# Patient Record
Sex: FEMALE | Race: WHITE
[De-identification: names, ages, dates, MRNs, and addresses within clinical notes are randomized per-mention and may not be internally consistent; named-entity substitution may affect disease eponyms.]

---

## 2022-01-03 ENCOUNTER — HOSPITAL ENCOUNTER (INPATIENT)
Dept: HOSPITAL 63 - GEROPSY | Age: 82
LOS: 17 days | Discharge: HOME HEALTH | DRG: 57 | End: 2022-01-20
Attending: PSYCHIATRY & NEUROLOGY | Admitting: PSYCHIATRY & NEUROLOGY
Payer: MEDICARE

## 2022-01-03 VITALS — SYSTOLIC BLOOD PRESSURE: 136 MMHG | DIASTOLIC BLOOD PRESSURE: 80 MMHG

## 2022-01-03 VITALS — HEIGHT: 65 IN | WEIGHT: 136.91 LBS | BODY MASS INDEX: 22.81 KG/M2

## 2022-01-03 DIAGNOSIS — G30.9: Primary | ICD-10-CM

## 2022-01-03 DIAGNOSIS — F32.A: ICD-10-CM

## 2022-01-03 DIAGNOSIS — R63.30: ICD-10-CM

## 2022-01-03 DIAGNOSIS — Z96.82: ICD-10-CM

## 2022-01-03 DIAGNOSIS — R26.81: ICD-10-CM

## 2022-01-03 DIAGNOSIS — Z88.0: ICD-10-CM

## 2022-01-03 DIAGNOSIS — R40.0: ICD-10-CM

## 2022-01-03 DIAGNOSIS — M41.9: ICD-10-CM

## 2022-01-03 DIAGNOSIS — R45.1: ICD-10-CM

## 2022-01-03 DIAGNOSIS — N39.0: ICD-10-CM

## 2022-01-03 DIAGNOSIS — Z20.822: ICD-10-CM

## 2022-01-03 DIAGNOSIS — H91.90: ICD-10-CM

## 2022-01-03 DIAGNOSIS — G47.00: ICD-10-CM

## 2022-01-03 DIAGNOSIS — M54.9: ICD-10-CM

## 2022-01-03 DIAGNOSIS — Z91.83: ICD-10-CM

## 2022-01-03 DIAGNOSIS — F41.1: ICD-10-CM

## 2022-01-03 DIAGNOSIS — Z79.899: ICD-10-CM

## 2022-01-03 DIAGNOSIS — Z87.440: ICD-10-CM

## 2022-01-03 DIAGNOSIS — R15.9: ICD-10-CM

## 2022-01-03 DIAGNOSIS — G89.29: ICD-10-CM

## 2022-01-03 DIAGNOSIS — E78.5: ICD-10-CM

## 2022-01-03 DIAGNOSIS — R32: ICD-10-CM

## 2022-01-03 DIAGNOSIS — F63.9: ICD-10-CM

## 2022-01-03 DIAGNOSIS — F02.81: ICD-10-CM

## 2022-01-03 DIAGNOSIS — R45.87: ICD-10-CM

## 2022-01-03 LAB
ALBUMIN SERPL-MCNC: 3.7 G/DL (ref 3.4–5)
ALBUMIN/GLOB SERPL: 1.1 {RATIO} (ref 1–1.7)
ALP SERPL-CCNC: 94 U/L (ref 46–116)
ALT SERPL-CCNC: 16 U/L (ref 14–59)
ANION GAP SERPL CALC-SCNC: 9 MMOL/L (ref 6–14)
AST SERPL-CCNC: 19 U/L (ref 15–37)
BASOPHILS # BLD AUTO: 0 X10^3/UL (ref 0–0.2)
BASOPHILS NFR BLD: 1 % (ref 0–3)
BILIRUB SERPL-MCNC: 0.3 MG/DL (ref 0.2–1)
BUN/CREAT SERPL: 31 (ref 6–20)
CA-I SERPL ISE-MCNC: 25 MG/DL (ref 7–20)
CALCIUM SERPL-MCNC: 8.7 MG/DL (ref 8.5–10.1)
CHLORIDE SERPL-SCNC: 99 MMOL/L (ref 98–107)
CO2 SERPL-SCNC: 27 MMOL/L (ref 21–32)
CREAT SERPL-MCNC: 0.8 MG/DL (ref 0.6–1)
EOSINOPHIL NFR BLD: 0 X10^3/UL (ref 0–0.7)
EOSINOPHIL NFR BLD: 1 % (ref 0–3)
ERYTHROCYTE [DISTWIDTH] IN BLOOD BY AUTOMATED COUNT: 12.6 % (ref 11.5–14.5)
GFR SERPLBLD BASED ON 1.73 SQ M-ARVRAT: 68.5 ML/MIN
GLOBULIN SER-MCNC: 3.4 G/DL (ref 2.2–3.8)
GLUCOSE SERPL-MCNC: 109 MG/DL (ref 70–99)
HCT VFR BLD CALC: 35.3 % (ref 36–47)
HGB BLD-MCNC: 11.9 G/DL (ref 12–15.5)
LYMPHOCYTES # BLD: 1.7 X10^3/UL (ref 1–4.8)
LYMPHOCYTES NFR BLD AUTO: 21 % (ref 24–48)
MAGNESIUM SERPL-MCNC: 2.1 MG/DL (ref 1.8–2.4)
MCH RBC QN AUTO: 31 PG (ref 25–35)
MCHC RBC AUTO-ENTMCNC: 34 G/DL (ref 31–37)
MCV RBC AUTO: 91 FL (ref 79–100)
MONO #: 0.6 X10^3/UL (ref 0–1.1)
MONOCYTES NFR BLD: 8 % (ref 0–9)
NEUT #: 5.4 X10^3UL (ref 1.8–7.7)
NEUTROPHILS NFR BLD AUTO: 70 % (ref 31–73)
PLATELET # BLD AUTO: 331 X10^3/UL (ref 140–400)
POTASSIUM SERPL-SCNC: 4 MMOL/L (ref 3.5–5.1)
PROT SERPL-MCNC: 7.1 G/DL (ref 6.4–8.2)
RBC # BLD AUTO: 3.87 X10^6/UL (ref 3.5–5.4)
SODIUM SERPL-SCNC: 135 MMOL/L (ref 136–145)
WBC # BLD AUTO: 7.8 X10^3/UL (ref 4–11)

## 2022-01-03 PROCEDURE — 85379 FIBRIN DEGRADATION QUANT: CPT

## 2022-01-03 PROCEDURE — 82607 VITAMIN B-12: CPT

## 2022-01-03 PROCEDURE — 87086 URINE CULTURE/COLONY COUNT: CPT

## 2022-01-03 PROCEDURE — 87077 CULTURE AEROBIC IDENTIFY: CPT

## 2022-01-03 PROCEDURE — 85025 COMPLETE CBC W/AUTO DIFF WBC: CPT

## 2022-01-03 PROCEDURE — 83735 ASSAY OF MAGNESIUM: CPT

## 2022-01-03 PROCEDURE — 80061 LIPID PANEL: CPT

## 2022-01-03 PROCEDURE — 93005 ELECTROCARDIOGRAM TRACING: CPT

## 2022-01-03 PROCEDURE — 84436 ASSAY OF TOTAL THYROXINE: CPT

## 2022-01-03 PROCEDURE — 83540 ASSAY OF IRON: CPT

## 2022-01-03 PROCEDURE — 87186 SC STD MICRODIL/AGAR DIL: CPT

## 2022-01-03 PROCEDURE — 83550 IRON BINDING TEST: CPT

## 2022-01-03 PROCEDURE — U0003 INFECTIOUS AGENT DETECTION BY NUCLEIC ACID (DNA OR RNA); SEVERE ACUTE RESPIRATORY SYNDROME CORONAVIRUS 2 (SARS-COV-2) (CORONAVIRUS DISEASE [COVID-19]), AMPLIFIED PROBE TECHNIQUE, MAKING USE OF HIGH THROUGHPUT TECHNOLOGIES AS DESCRIBED BY CMS-2020-01-R: HCPCS

## 2022-01-03 PROCEDURE — 80053 COMPREHEN METABOLIC PANEL: CPT

## 2022-01-03 PROCEDURE — 83036 HEMOGLOBIN GLYCOSYLATED A1C: CPT

## 2022-01-03 PROCEDURE — 84443 ASSAY THYROID STIM HORMONE: CPT

## 2022-01-03 PROCEDURE — 86592 SYPHILIS TEST NON-TREP QUAL: CPT

## 2022-01-03 PROCEDURE — 36415 COLL VENOUS BLD VENIPUNCTURE: CPT

## 2022-01-03 PROCEDURE — 81001 URINALYSIS AUTO W/SCOPE: CPT

## 2022-01-03 PROCEDURE — 84480 ASSAY TRIIODOTHYRONINE (T3): CPT

## 2022-01-03 PROCEDURE — 82306 VITAMIN D 25 HYDROXY: CPT

## 2022-01-03 RX ADMIN — MEMANTINE HYDROCHLORIDE SCH MG: 5 TABLET ORAL at 20:09

## 2022-01-03 RX ADMIN — DONEPEZIL HYDROCHLORIDE SCH MG: 10 TABLET ORAL at 20:09
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
## 2022-01-04 VITALS — DIASTOLIC BLOOD PRESSURE: 84 MMHG | SYSTOLIC BLOOD PRESSURE: 181 MMHG

## 2022-01-04 VITALS — SYSTOLIC BLOOD PRESSURE: 150 MMHG | DIASTOLIC BLOOD PRESSURE: 77 MMHG

## 2022-01-04 LAB
APTT PPP: YELLOW S
BACTERIA #/AREA URNS HPF: (no result) /HPF
BILIRUB UR QL STRIP: (no result)
CHOLEST/HDLC SERPL: 4 {RATIO}
FIBRINOGEN PPP-MCNC: (no result) MG/DL
GLUCOSE UR STRIP-MCNC: (no result) MG/DL
HDLC SERPL-MCNC: 46 MG/DL (ref 40–60)
LDLC: 147 MG/DL (ref 0–100)
NITRITE UR QL STRIP: (no result)
RBC #/AREA URNS HPF: 0 /HPF (ref 0–2)
SP GR UR STRIP: 1.02
SQUAMOUS #/AREA URNS LPF: (no result) /LPF
T3 SERPL-MCNC: 90 NG/DL (ref 71–180)
T4 SERPL-MCNC: 8.5 UG/DL (ref 4.5–12)
THYROID STIM HORMONE (TSH): 3.21 UIU/ML (ref 0.36–3.74)
TRIGL SERPL-MCNC: 96 MG/DL (ref 0–150)
UROBILINOGEN UR-MCNC: 0.2 MG/DL
VLDLC: 19 MG/DL (ref 0–40)
WBC #/AREA URNS HPF: (no result) /HPF (ref 0–4)

## 2022-01-04 RX ADMIN — LOSARTAN POTASSIUM SCH MG: 50 TABLET, FILM COATED ORAL at 08:55

## 2022-01-04 RX ADMIN — MEMANTINE HYDROCHLORIDE SCH MG: 5 TABLET ORAL at 20:47

## 2022-01-04 RX ADMIN — MULTIPLE VITAMINS W/ MINERALS TAB SCH TAB: TAB at 08:55

## 2022-01-04 RX ADMIN — Medication SCH CAP: at 08:55

## 2022-01-04 RX ADMIN — SIMVASTATIN SCH MG: 40 TABLET, FILM COATED ORAL at 08:55

## 2022-01-04 RX ADMIN — DONEPEZIL HYDROCHLORIDE SCH MG: 10 TABLET ORAL at 20:47

## 2022-01-04 RX ADMIN — VITAMIN D, TAB 1000IU (100/BT) SCH UNIT: 25 TAB at 08:55

## 2022-01-04 NOTE — EKG
[facility] [address], [place] [address]

Test Date:    2022               Test Time:    07:59:32

Pat Name:     MARIELA TURCIOS        Department:   

Patient ID:   [id number]           Room:         [address] [place] [address]

Gender:       F                        Technician:   

:          [id number]               Requested By: ALBINO BIRD

Order Number: [id number]            Reading MD:   Cm Young

                                 Measurements

Intervals                              Axis          

Rate:         68                       P:            114

MA:           130                      QRS:          -16

QRSD:         74                       T:            37

QT:           404                                    

QTc:          434                                    

                           Interpretive Statements

SINUS RHYTHM

LEFT ATRIAL ABNORMALITY

LEFTWARD AXIS

Electronically Signed On 2022 15:08:14 CST by Cm Yonug
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
## 2022-01-04 NOTE — NUR
Patient has been disorganized, restless, and pleasantly confused throughout this shift.  She 
has been resistive to cares, possibly related to chronic low back pain.  Patient has had a 
poor appetite at meals but accepted Ensures when offered. Patient was social with peers and 
interactive with staff for most of the day, she did not answer MD's questions during rounds. 
 Will continue to monitor and report to oncoming shift.

## 2022-01-05 VITALS — SYSTOLIC BLOOD PRESSURE: 150 MMHG | DIASTOLIC BLOOD PRESSURE: 90 MMHG

## 2022-01-05 VITALS — SYSTOLIC BLOOD PRESSURE: 154 MMHG | DIASTOLIC BLOOD PRESSURE: 79 MMHG

## 2022-01-05 LAB — HBA1C MFR BLD: 5.9 % (ref 4.8–5.6)

## 2022-01-05 RX ADMIN — Medication SCH CAP: at 09:15

## 2022-01-05 RX ADMIN — MEMANTINE HYDROCHLORIDE SCH MG: 5 TABLET ORAL at 20:46

## 2022-01-05 RX ADMIN — MULTIPLE VITAMINS W/ MINERALS TAB SCH TAB: TAB at 09:15

## 2022-01-05 RX ADMIN — LOSARTAN POTASSIUM SCH MG: 50 TABLET, FILM COATED ORAL at 09:15

## 2022-01-05 RX ADMIN — SIMVASTATIN SCH MG: 40 TABLET, FILM COATED ORAL at 09:15

## 2022-01-05 RX ADMIN — VITAMIN D, TAB 1000IU (100/BT) SCH UNIT: 25 TAB at 09:15

## 2022-01-05 RX ADMIN — DONEPEZIL HYDROCHLORIDE SCH MG: 10 TABLET ORAL at 20:46

## 2022-01-05 NOTE — NUR
Nurse Note:



Pt was too confused to understand how to take her medication for morning medications.  After 
encouragement and time spent with pt, pt was still unable to understand how to take her 
medication, and was unable to take some due to being too confused.

## 2022-01-05 NOTE — NUR
Nurse Day Shift Note:



Pt presents with pleasantly confused mood/affect.  Pt is encouraged to eat her meals, 
however pt has a very difficult time grasping the concept to eat her food.  Pt behaves 
agreeably, yet, appears to not understand what the food on her plate is for.  Pt is 
encouraged to eat, and might take a small bite, but generally shows no interest in eating, 
due to being confused about what the food is for.  Pt is given ensures, and encouraged to 
drink those.  Pt is noted to spend time in her room and in the hallway during the day.  Pt 
is low-key on the unit.  Pt slept 3.75 hours last night.  Will continue to monitor.

## 2022-01-05 NOTE — NUR
Nursing Note

Pt unable to understand what spoon or drinking cup are.  Gets increasingly angry with 
attempts to help or show her how to eat and drink.  Swats at cup and spoon, takes applesauce 
and rubs into her skin as though it is hand lotion.  Pt talks in a word salad nothing at all 
intelligible.  Very easily agitated and combative with staff.
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
## 2022-01-05 NOTE — PN
DATE: 01/05/2022

SUBJECTIVE:  The patient is seen today, met with the staff.  Chart reviewed.  

Staff reports increased confusion, not able to follow directions, needing 

assistance with ADLs.  Also difficult to redirect.



OBSERVATION:

VITAL SIGNS:  Temperature 98.3, blood pressure 157/77, pulse 77, respirations 

20, O2 sat 94%.

GENERAL:  Slept about 4 hours last night.  The patient is somewhat unsteady, but

able to walk.  She is wandering and not able to think clearly.  Affect 

inappropriate at times.  The patient not able to follow directions.  The patient

also having problems with executive functioning.  The patient is needing 

assistance with ADLs.



LABORATORY DATA:  The patient's lab reviewed.  The patient's hemoglobin was 

11.9.  The patient's hemoglobin A1c was 5.9.  The patient also has 

hyperlipidemia.  The patient's urinalysis showed protein, trace of blood and 

5-10, white cell count.



CURRENT MEDICATIONS:  The patient's current medications include Namenda 5 mg at 

night, Aricept 10 mg at night.  The patient is not having any side effects.  The

patient has not received any p.r.n. medications.  The patient is under 

observation.  We will consider increasing Namenda.



ASSESSMENT:

1.  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 

behavior problems.

2.  Generalized anxiety disorder.



PLAN:  Continue treatment.



LENGTH OF STAY:  7-10 days.







VK/MAR

DR: Meredith   DD: 01/05/2022 17:06

DT: 01/05/2022 22:18   TID: [id number]

## 2022-01-06 VITALS — SYSTOLIC BLOOD PRESSURE: 143 MMHG | DIASTOLIC BLOOD PRESSURE: 86 MMHG

## 2022-01-06 VITALS — DIASTOLIC BLOOD PRESSURE: 88 MMHG | SYSTOLIC BLOOD PRESSURE: 133 MMHG

## 2022-01-06 RX ADMIN — LOSARTAN POTASSIUM SCH MG: 50 TABLET, FILM COATED ORAL at 09:02

## 2022-01-06 RX ADMIN — MULTIPLE VITAMINS W/ MINERALS TAB SCH TAB: TAB at 09:02

## 2022-01-06 RX ADMIN — MEMANTINE HYDROCHLORIDE SCH MG: 5 TABLET ORAL at 21:06

## 2022-01-06 RX ADMIN — DONEPEZIL HYDROCHLORIDE SCH MG: 10 TABLET ORAL at 21:06

## 2022-01-06 RX ADMIN — VITAMIN D, TAB 1000IU (100/BT) SCH UNIT: 25 TAB at 09:02

## 2022-01-06 RX ADMIN — Medication SCH CAP: at 09:02

## 2022-01-06 RX ADMIN — SIMVASTATIN SCH MG: 40 TABLET, FILM COATED ORAL at 09:02

## 2022-01-06 NOTE — NUR
Nsg Note; Yadira is confused and unable to follow simple directions, such as put the pill 
in your mouth, take a drink of water, and swallow.  I had to put the pill in her mouth, put 
the cup of water to her mouth, then she took a drink and swished it around without 
swallowing.  It took many prompts and encouragement to swallow, which she did, then spit the 
pill out.  I then crushed the pill and gave it to her in a bite of pudding which she then 
swallowed.
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
## 2022-01-07 VITALS — DIASTOLIC BLOOD PRESSURE: 99 MMHG | SYSTOLIC BLOOD PRESSURE: 161 MMHG

## 2022-01-07 RX ADMIN — SIMVASTATIN SCH MG: 40 TABLET, FILM COATED ORAL at 08:03

## 2022-01-07 RX ADMIN — MULTIPLE VITAMINS W/ MINERALS TAB SCH TAB: TAB at 08:03

## 2022-01-07 RX ADMIN — LOSARTAN POTASSIUM SCH MG: 50 TABLET, FILM COATED ORAL at 08:03

## 2022-01-07 RX ADMIN — Medication SCH CAP: at 08:02

## 2022-01-07 RX ADMIN — VITAMIN D, TAB 1000IU (100/BT) SCH UNIT: 25 TAB at 08:03

## 2022-01-07 NOTE — PN
DATE: 01/06/2022

SUBJECTIVE:  The patient was seen today, met with the staff, chart reviewed and 

also participated in the treatment review meeting.  Staff reports she continues 

to be confused, inability to communicate tend to isolate herself and also 

wandering and unsteady gait, but no falls.  The patient also needing assistance 

with ADLs.  She is also incontinent of bladder and bowels.



OBSERVATION:

VITAL SIGNS:  Temperature 98.2, blood pressure 143/86, pulse 84, respirations 

16, O2 sat 94%.

GENERAL:  Slept about 7 hours last night.  The patient's appetite decreased.



LABORATORY DATA:  The patient's lab reviewed.



CURRENT MEDICATIONS:  The patient's current medications include Levaquin 250 mg 

daily, Namenda 5 mg at night, Aricept 10 mg at night.  She is also on Zocor 40 

mg daily and vitamin D 5000 units daily.  The patient is not having any side 

effects.  The patient's symptom is mildly advanced dementia with severe 

cognitive deficits and also limited executive functions.  The patient is needing

assistance with ADLs.



ASSESSMENT:

1.  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 

behavior problems:

2.  Generalized anxiety disorder.



PLAN:  Continue with the treatment.



LENGTH OF STAY:  7-10 days.







LUIS E/YO

DR: Meredith   DD: 01/06/2022 16:59

DT: 01/06/2022 20:54   TID: [id number]

## 2022-01-07 NOTE — NUR
Nursing Note

The patient was disorganized but cooperative this shift. The patient took her medication 
crushed in pudding. The patient was alert to self only. The patient has not slept this 
shift; instead has laid in bed awake staring at the ceiling. The patient is currently awake 
laying in bed.

## 2022-01-07 NOTE — NUR
Patient is in the hallway on assumption of care, sitting in a chair. She is very 
disorganized, confused. Answers to her name but cannot state her last name or . 
Instructed patient that she needed to return to her room, and she was unable to follow 
direction. She is fiddling around with a snack wrapper, trying to use it as a handheld 
mirror. She is compliant with physical assessments but is unable to answer most other 
assessment questions. Patient appears to be sleeping comfortably at present time. Will 
continue to monitor.

## 2022-01-07 NOTE — NUR
Nursing Not

Pt lying in bed not eating, also is not up and busy this am like she had been.  Eyes seem 
glassy, rimmed in red somewhat.  Spoke with  updated him on condition and confusion 
level.

## 2022-01-08 VITALS — SYSTOLIC BLOOD PRESSURE: 155 MMHG | DIASTOLIC BLOOD PRESSURE: 94 MMHG

## 2022-01-08 VITALS — DIASTOLIC BLOOD PRESSURE: 78 MMHG | SYSTOLIC BLOOD PRESSURE: 124 MMHG

## 2022-01-08 RX ADMIN — SIMVASTATIN SCH MG: 40 TABLET, FILM COATED ORAL at 20:11

## 2022-01-08 RX ADMIN — LOSARTAN POTASSIUM SCH MG: 50 TABLET, FILM COATED ORAL at 07:35

## 2022-01-08 RX ADMIN — Medication SCH CAP: at 20:11

## 2022-01-08 RX ADMIN — SIMVASTATIN SCH MG: 40 TABLET, FILM COATED ORAL at 07:21

## 2022-01-08 RX ADMIN — Medication SCH CAP: at 07:35

## 2022-01-08 RX ADMIN — MULTIPLE VITAMINS W/ MINERALS TAB SCH TAB: TAB at 09:00

## 2022-01-08 RX ADMIN — VITAMIN D, TAB 1000IU (100/BT) SCH UNIT: 25 TAB at 09:00

## 2022-01-08 RX ADMIN — DOXYCYCLINE HYCLATE SCH MG: 100 TABLET, COATED ORAL at 20:12

## 2022-01-08 NOTE — NUR
Patient is in the hallway on assumption of care, sitting in a chair. She is very 
disorganized, confused. Answers to her name but cannot state her last name or .  She is 
compliant with physical assessments but is unable to answer most other assessment questions. 
Compliant with medications crushed in pudding. Patient appears to be sleeping comfortably at 
present time. Will continue to monitor.

## 2022-01-08 NOTE — PN
DATE: 01/07/2022

SUBJECTIVE:  Staff reports that the patient is staying in bed most of the time, 

not eating.  The patient is confused, difficult to redirect.



OBSERVATION:

VITAL SIGNS:  Temperature 97.6, blood pressure 161/99, pulse 75, respirations 

22, O2 sat 96%.

GENERAL:  Slept about an hour last night.



LABORATORY DATA:  The patient's lab reviewed.  No change from prior readings.



CURRENT MEDICATIONS:  Include levofloxacin 250 mg daily for UTI.  The patient is

still under observation, monitoring her behavior, not needing any psychotropic 

drugs, at this time control her behavior.



ASSESSMENT:

1.  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 

behavior problems:

2.  Generalized anxiety disorder.



PLAN:  To continue with treatment.



LENGTH OF STAY:  7-10 days.







LUIS E/YO

DR: Meredith   DD: 01/07/2022 17:02

DT: 01/07/2022 22:12   TID: [id number]

## 2022-01-08 NOTE — NUR
Pt A&O to self only, very confused and disorganized. On more than one occasion she was 
discovered wandering into another patient's room, stating that the room is hers. She has 
great difficulty following directions from staff d/t the severity of her confusion. She is 
compliant with medications crushed and mixed into pudding. She is absent of 
disruptive/violent behaviors on the unit. Plan of care continues, will pass to next shift.

## 2022-01-08 NOTE — NUR
Pt in bed sleeping. Breathing even, equal and unlabored. No signs of pain or distress. Shift 
assessment and medication administration pending. Will continue to monitor.

## 2022-01-09 VITALS — SYSTOLIC BLOOD PRESSURE: 140 MMHG | DIASTOLIC BLOOD PRESSURE: 82 MMHG

## 2022-01-09 VITALS — DIASTOLIC BLOOD PRESSURE: 85 MMHG | SYSTOLIC BLOOD PRESSURE: 147 MMHG

## 2022-01-09 RX ADMIN — MULTIPLE VITAMINS W/ MINERALS TAB SCH TAB: TAB at 08:57

## 2022-01-09 RX ADMIN — Medication SCH CAP: at 08:58

## 2022-01-09 RX ADMIN — DOXYCYCLINE HYCLATE SCH MG: 100 TABLET, COATED ORAL at 08:57

## 2022-01-09 RX ADMIN — DOXYCYCLINE HYCLATE SCH MG: 100 TABLET, COATED ORAL at 20:22

## 2022-01-09 RX ADMIN — VITAMIN D, TAB 1000IU (100/BT) SCH UNIT: 25 TAB at 08:57

## 2022-01-09 RX ADMIN — LOSARTAN POTASSIUM SCH MG: 50 TABLET, FILM COATED ORAL at 08:57

## 2022-01-09 RX ADMIN — SIMVASTATIN SCH MG: 40 TABLET, FILM COATED ORAL at 20:22

## 2022-01-09 RX ADMIN — Medication SCH CAP: at 20:22

## 2022-01-09 NOTE — NUR
Nsg Note; Yadira is very confused, knowing her first name only.  When given a meal tray, 
she does not know how to use the utensils to get food to her mouth.  We feed her but she 
will only take a few bites and sips of fluids.  She has been awake and alert today, mostly 
sitting in the chair in her room or walking around the room.  She self toilets

## 2022-01-09 NOTE — NUR
Pt located in the hallway this evening. Pt restless and wandering ghosh. Pt highly 
disorganized. Able to only answer to her name this evening. Compliant with crushed 
medications and assessment. Pt currently sleeping.

## 2022-01-09 NOTE — PDOC
Exam


Note:


Kuldip Note:


Late entry for 01/08/2022. Please also refer to the separate dictated note~for 

this date of service dictated separately.~Patient seen individually. Discussed 

the patient with Nursing staff reviewed the chart.~Reviewed interim history and 

current functioning. Reviewed vital signs,~Labs/ Radiology~and current medic

ations noted below. Continue current treatment with the changes noted in the 

dictated addendum note





Assessment:


Vital Signs/I&O:





                                   Vital Signs








  Date Time  Temp Pulse Resp B/P (MAP) Pulse Ox O2 Delivery O2 Flow Rate FiO2


 


1/9/22 16:19 97.6 72 20 140/82 (101) 95 Room Air  














                                    I & O   


 


 1/8/22 1/8/22 1/9/22





 14:59 22:59 06:59


 


Intake Total 150 ml 120 ml 


 


Balance 150 ml 120 ml 











Current Medications:


I have reviewed the current psychotropics carefully including drug interactions.

 Risk benefit ratio favors no change other than as noted in my dictated progress

note.





Diagnosis:


Problems:  


(1) Major neurocognitive disorder


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 

disturbance


(5) Anxiety disorder, unspecified


(6) Impulse control disorder, unspecified











DAWNA BAILEY MD                  Jan 9, 2022 21:06

## 2022-01-09 NOTE — PDOC
Exam


Note:


Kuldip Note:


Please also refer to the separate dictated note~for this date of service 

dictated separately.~Patient seen individually. Discussed the patient with 

Nursing staff reviewed the chart.~Reviewed interim history and current 

functioning. Reviewed vital signs,~Labs/ Radiology~and current medications noted

below. Continue current treatment with the changes noted in the dictated 

addendum note





Assessment:


Vital Signs/I&O:





                                   Vital Signs








  Date Time  Temp Pulse Resp B/P (MAP) Pulse Ox O2 Delivery O2 Flow Rate FiO2


 


1/9/22 16:19 97.6 72 20 140/82 (101) 95 Room Air  














                                    I & O   


 


 1/8/22 1/8/22 1/9/22





 14:59 22:59 06:59


 


Intake Total 150 ml 120 ml 


 


Balance 150 ml 120 ml 











Current Medications:


Meds:





Current Medications








 Medications


  (Trade)  Dose


 Ordered  Sig/Xin


 Route


 PRN Reason  Start Time


 Stop Time Status Last Admin


Dose Admin


 


 Acetaminophen


  (Tylenol)  650 mg  PRN Q6HRS  PRN


 PO


 MILD PAIN / TEMP > 100.3'F  1/3/22 15:15


     





 


 Multi-Ingredient


 Ointment


  (Analgesic Balm)  1 dwayne  PRN QID  PRN


 TP


 MUSCLE PAIN  1/3/22 15:15


     





 


 Al Hydroxide/Mg


 Hydroxide


  (Mylanta Plus Xs)  15 ml  PRN AFTMEALHC  PRN


 PO


 DYSPEPSIA  1/3/22 15:15


     





 


 Magnesium


 Hydroxide


  (Milk Of


 Magnesia)  2,400 mg  PRN QHS  PRN


 PO


 1st choice CONSTIPATION  1/3/22 15:15


     





 


 Diphenoxylate HCl/


 Atropine


  (Lomotil)  1 tab  PRN BID  PRN


 PO


 DIARRHEA  1/3/22 16:15


     





 


 Docusate Sodium


  (Colace)  100 mg  PRN DAILY  PRN


 PO


 2ND CHOICE CONSTIPATION  1/3/22 16:15


     





 


 Donepezil HCl


  (Aricept)  10 mg  QHS


 PO


   1/3/22 21:00


 1/7/22 11:37 DC 1/6/22 21:06





 


 Losartan Potassium


  (Cozaar)  50 mg  DAILY


 PO


   1/4/22 09:00


    1/9/22 08:57





 


 Memantine


  (Namenda)  5 mg  QHS


 PO


   1/3/22 21:00


 1/7/22 11:37 DC 1/6/22 21:06





 


 Simvastatin


  (Zocor)  40 mg  DAILY


 PO


   1/4/22 09:00


 1/8/22 12:13 DC 1/7/22 08:03





 


 Vitamin D


  (Vitamin D3)  5,000 unit  DAILY


 PO


   1/4/22 09:00


    1/9/22 08:57





 


 Glucosamine/


 Chondroitin


  (Glucosamine-Chondroitin


 500/400mg)  1 cap  DAILY


 PO


   1/4/22 09:00


    1/9/22 08:58





 


 Multivitamins/


 Calcium


  (Thera-M Plus)  1 tab  DAILY


 PO


   1/4/22 09:00


    1/9/22 08:57





 


 Acetaminophen/


 Codeine Phosphate


  (Tylenol #3)  1 tab  PRN BID  PRN


 PO


 PAIN  1/3/22 17:00


     





 


 Levofloxacin


  (Levaquin)  250 mg  DAILY06


 PO


   1/6/22 12:15


 1/8/22 12:13 DC 1/7/22 05:23





 


 Lactobacillus


 Rhamnosus


  (Culturelle)  1 cap  BID


 PO


   1/8/22 21:00


    1/9/22 20:22





 


 Simvastatin


  (Zocor)  40 mg  HS


 PO


   1/8/22 21:00


    1/9/22 20:22





 


 Doxycycline


 Hyclate


  (Vibra-Tab)  100 mg  BID


 PO


   1/8/22 21:00


 1/13/22 12:00  1/9/22 20:22











I have reviewed the current psychotropics carefully including drug interactions.

 Risk benefit ratio favors no change other than as noted in my dictated progress

note.





Diagnosis:


Problems:  


(1) Impulse control disorder, unspecified


(2) Anxiety disorder, unspecified


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 

disturbance


(6) Major neurocognitive disorder











DAWNA BAILEY MD                  Jan 9, 2022 21:15

## 2022-01-10 VITALS — SYSTOLIC BLOOD PRESSURE: 165 MMHG | DIASTOLIC BLOOD PRESSURE: 89 MMHG

## 2022-01-10 VITALS — DIASTOLIC BLOOD PRESSURE: 53 MMHG | SYSTOLIC BLOOD PRESSURE: 118 MMHG

## 2022-01-10 LAB
ALBUMIN SERPL-MCNC: 3.6 G/DL (ref 3.4–5)
ALBUMIN/GLOB SERPL: 1.1 {RATIO} (ref 1–1.7)
ALP SERPL-CCNC: 105 U/L (ref 46–116)
ALT SERPL-CCNC: 15 U/L (ref 14–59)
ANION GAP SERPL CALC-SCNC: 8 MMOL/L (ref 6–14)
AST SERPL-CCNC: 15 U/L (ref 15–37)
BASOPHILS # BLD AUTO: 0.1 X10^3/UL (ref 0–0.2)
BASOPHILS NFR BLD: 1 % (ref 0–3)
BILIRUB SERPL-MCNC: 0.3 MG/DL (ref 0.2–1)
BUN/CREAT SERPL: 36 (ref 6–20)
CA-I SERPL ISE-MCNC: 32 MG/DL (ref 7–20)
CALCIUM SERPL-MCNC: 8.8 MG/DL (ref 8.5–10.1)
CHLORIDE SERPL-SCNC: 107 MMOL/L (ref 98–107)
CO2 SERPL-SCNC: 30 MMOL/L (ref 21–32)
CREAT SERPL-MCNC: 0.9 MG/DL (ref 0.6–1)
EOSINOPHIL NFR BLD: 0.1 X10^3/UL (ref 0–0.7)
EOSINOPHIL NFR BLD: 2 % (ref 0–3)
ERYTHROCYTE [DISTWIDTH] IN BLOOD BY AUTOMATED COUNT: 12.8 % (ref 11.5–14.5)
GFR SERPLBLD BASED ON 1.73 SQ M-ARVRAT: 59.8 ML/MIN
GLOBULIN SER-MCNC: 3.2 G/DL (ref 2.2–3.8)
GLUCOSE SERPL-MCNC: 101 MG/DL (ref 70–99)
HCT VFR BLD CALC: 36.9 % (ref 36–47)
HGB BLD-MCNC: 12 G/DL (ref 12–15.5)
LYMPHOCYTES # BLD: 2.3 X10^3/UL (ref 1–4.8)
LYMPHOCYTES NFR BLD AUTO: 26 % (ref 24–48)
MCH RBC QN AUTO: 30 PG (ref 25–35)
MCHC RBC AUTO-ENTMCNC: 33 G/DL (ref 31–37)
MCV RBC AUTO: 92 FL (ref 79–100)
MONO #: 0.7 X10^3/UL (ref 0–1.1)
MONOCYTES NFR BLD: 8 % (ref 0–9)
NEUT #: 5.7 X10^3UL (ref 1.8–7.7)
NEUTROPHILS NFR BLD AUTO: 65 % (ref 31–73)
PLATELET # BLD AUTO: 352 X10^3/UL (ref 140–400)
POTASSIUM SERPL-SCNC: 3.6 MMOL/L (ref 3.5–5.1)
PROT SERPL-MCNC: 6.8 G/DL (ref 6.4–8.2)
RBC # BLD AUTO: 4.01 X10^6/UL (ref 3.5–5.4)
SODIUM SERPL-SCNC: 145 MMOL/L (ref 136–145)
WBC # BLD AUTO: 8.9 X10^3/UL (ref 4–11)

## 2022-01-10 RX ADMIN — DOXYCYCLINE HYCLATE SCH MG: 100 TABLET, COATED ORAL at 09:21

## 2022-01-10 RX ADMIN — Medication SCH CAP: at 09:21

## 2022-01-10 RX ADMIN — LOSARTAN POTASSIUM SCH MG: 50 TABLET, FILM COATED ORAL at 09:22

## 2022-01-10 RX ADMIN — DOXYCYCLINE HYCLATE SCH MG: 100 TABLET, COATED ORAL at 20:57

## 2022-01-10 RX ADMIN — SIMVASTATIN SCH MG: 40 TABLET, FILM COATED ORAL at 20:57

## 2022-01-10 RX ADMIN — Medication SCH CAP: at 09:22

## 2022-01-10 RX ADMIN — Medication SCH CAP: at 20:57

## 2022-01-10 RX ADMIN — VITAMIN D, TAB 1000IU (100/BT) SCH UNIT: 25 TAB at 09:22

## 2022-01-10 RX ADMIN — MULTIPLE VITAMINS W/ MINERALS TAB SCH TAB: TAB at 09:21

## 2022-01-10 RX ADMIN — MIRTAZAPINE SCH MG: 7.5 TABLET, FILM COATED ORAL at 20:57

## 2022-01-10 NOTE — NUR
Nursing note:



Patient in room for assessment and medications. She is compliant with medications crushed in 
pudding. She is A/o to first name only, unable to voice last name or date of birth. Her 
 called in the am, she didn't know what to do with the phone. Patient then began to 
make disruptive loud noises from her room, when approached by staff she denied making them. 
She was hitting cup on side table, when staff moved table away she smacked their arm. 
Patient denies pain/discomfort at this time. She walks  independently in room, staff 
encourages her to use walker without success. She is currently laying awake in bed. Will 
continue to monitor.  

-------------------------------------------------------------------------------

Addendum: 01/10/22 at 1550 by DANIEL HAM RN RN

-------------------------------------------------------------------------------

Patient continues to be isolated to her room due to COVID precautions.

## 2022-01-10 NOTE — PDOC
Exam


Note:


Kuldip Note:


Please also refer to the separate dictated note~for this date of service 

dictated separately.~Patient seen individually. Discussed the patient with 

Nursing staff reviewed the chart.~Reviewed interim history and current 

functioning. Reviewed vital signs,~Labs/ Radiology~and current medications noted

below. Continue current treatment with the changes noted in the dictated 

addendum note





Assessment:


Vital Signs/I&O:





                                   Vital Signs








  Date Time  Temp Pulse Resp B/P (MAP) Pulse Ox O2 Delivery O2 Flow Rate FiO2


 


1/10/22 15:43 98.3 69 18 118/53 (74) 93   


 


1/9/22 16:19      Room Air  














                                    I & O   


 


 1/9/22 1/9/22 1/10/22





 15:00 23:00 07:00


 


Intake Total 360 ml 480 ml 


 


Balance 360 ml 480 ml 








Labs:





                                Laboratory Tests








Test


 1/10/22


06:23


 


White Blood Count


 8.9 x10^3/uL


(4.0-11.0)


 


Red Blood Count


 4.01 x10^6/uL


(3.50-5.40)


 


Hemoglobin


 12.0 g/dL


(12.0-15.5)


 


Hematocrit


 36.9 %


(36.0-47.0)


 


Mean Corpuscular Volume


 92 fL ()





 


Mean Corpuscular Hemoglobin 30 pg (25-35)  


 


Mean Corpuscular Hemoglobin


Concent 33 g/dL


(31-37)


 


Red Cell Distribution Width


 12.8 %


(11.5-14.5)


 


Platelet Count


 352 x10^3/uL


(140-400)


 


Neutrophils (%) (Auto) 65 % (31-73)  


 


Lymphocytes (%) (Auto) 26 % (24-48)  


 


Monocytes (%) (Auto) 8 % (0-9)  


 


Eosinophils (%) (Auto) 2 % (0-3)  


 


Basophils (%) (Auto) 1 % (0-3)  


 


Neutrophils # (Auto)


 5.7 x10^3uL


(1.8-7.7)


 


Lymphocytes # (Auto)


 2.3 x10^3/uL


(1.0-4.8)


 


Monocytes # (Auto)


 0.7 x10^3/uL


(0.0-1.1)


 


Eosinophils # (Auto)


 0.1 x10^3/uL


(0.0-0.7)


 


Basophils # (Auto)


 0.1 x10^3/uL


(0.0-0.2)


 


Sodium Level


 145 mmol/L


(136-145)


 


Potassium Level


 3.6 mmol/L


(3.5-5.1)


 


Chloride Level


 107 mmol/L


()


 


Carbon Dioxide Level


 30 mmol/L


(21-32)


 


Anion Gap 8 (6-14)  


 


Blood Urea Nitrogen


 32 mg/dL


(7-20)  H


 


Creatinine


 0.9 mg/dL


(0.6-1.0)


 


Estimated GFR


(Cockcroft-Gault) 59.8  





 


BUN/Creatinine Ratio 36 (6-20)  H


 


Glucose Level


 101 mg/dL


(70-99)  H


 


Calcium Level


 8.8 mg/dL


(8.5-10.1)


 


Total Bilirubin


 0.3 mg/dL


(0.2-1.0)


 


Aspartate Amino Transferase


(AST) 15 U/L (15-37)





 


Alanine Aminotransferase (ALT)


 15 U/L (14-59)





 


Alkaline Phosphatase


 105 U/L


()


 


Total Protein


 6.8 g/dL


(6.4-8.2)


 


Albumin


 3.6 g/dL


(3.4-5.0)


 


Albumin/Globulin Ratio 1.1 (1.0-1.7)  











Current Medications:


Meds:





Laboratory Tests








Test


 1/10/22


06:23


 


White Blood Count 8.9 x10^3/uL 


 


Red Blood Count 4.01 x10^6/uL 


 


Hemoglobin 12.0 g/dL 


 


Hematocrit 36.9 % 


 


Mean Corpuscular Volume 92 fL 


 


Mean Corpuscular Hemoglobin 30 pg 


 


Mean Corpuscular Hemoglobin


Concent 33 g/dL 





 


Red Cell Distribution Width 12.8 % 


 


Platelet Count 352 x10^3/uL 


 


Neutrophils (%) (Auto) 65 % 


 


Lymphocytes (%) (Auto) 26 % 


 


Monocytes (%) (Auto) 8 % 


 


Eosinophils (%) (Auto) 2 % 


 


Basophils (%) (Auto) 1 % 


 


Neutrophils # (Auto) 5.7 x10^3uL 


 


Lymphocytes # (Auto) 2.3 x10^3/uL 


 


Monocytes # (Auto) 0.7 x10^3/uL 


 


Eosinophils # (Auto) 0.1 x10^3/uL 


 


Basophils # (Auto) 0.1 x10^3/uL 


 


Sodium Level 145 mmol/L 


 


Potassium Level 3.6 mmol/L 


 


Chloride Level 107 mmol/L 


 


Carbon Dioxide Level 30 mmol/L 


 


Anion Gap 8 


 


Blood Urea Nitrogen 32 mg/dL 


 


Creatinine 0.9 mg/dL 


 


Estimated GFR


(Cockcroft-Gault) 59.8 





 


BUN/Creatinine Ratio 36 


 


Glucose Level 101 mg/dL 


 


Calcium Level 8.8 mg/dL 


 


Total Bilirubin 0.3 mg/dL 


 


Aspartate Amino Transf


(AST/SGOT) 15 U/L 





 


Alanine Aminotransferase


(ALT/SGPT) 15 U/L 





 


Alkaline Phosphatase 105 U/L 


 


Total Protein 6.8 g/dL 


 


Albumin 3.6 g/dL 


 


Albumin/Globulin Ratio 1.1 








Current Medications








 Medications


  (Trade)  Dose


 Ordered  Sig/Xin


 Route


 PRN Reason  Start Time


 Stop Time Status Last Admin


Dose Admin


 


 Acetaminophen


  (Tylenol)  650 mg  PRN Q6HRS  PRN


 PO


 MILD PAIN / TEMP > 100.3'F  1/3/22 15:15


     





 


 Multi-Ingredient


 Ointment


  (Analgesic Balm)  1 dwayne  PRN QID  PRN


 TP


 MUSCLE PAIN  1/3/22 15:15


     





 


 Al Hydroxide/Mg


 Hydroxide


  (Mylanta Plus Xs)  15 ml  PRN AFTMEALHC  PRN


 PO


 DYSPEPSIA  1/3/22 15:15


     





 


 Magnesium


 Hydroxide


  (Milk Of


 Magnesia)  2,400 mg  PRN QHS  PRN


 PO


 1st choice CONSTIPATION  1/3/22 15:15


     





 


 Diphenoxylate HCl/


 Atropine


  (Lomotil)  1 tab  PRN BID  PRN


 PO


 DIARRHEA  1/3/22 16:15


     





 


 Docusate Sodium


  (Colace)  100 mg  PRN DAILY  PRN


 PO


 2ND CHOICE CONSTIPATION  1/3/22 16:15


     





 


 Donepezil HCl


  (Aricept)  10 mg  QHS


 PO


   1/3/22 21:00


 1/7/22 11:37 DC 1/6/22 21:06





 


 Losartan Potassium


  (Cozaar)  50 mg  DAILY


 PO


   1/4/22 09:00


    1/10/22 09:22





 


 Memantine


  (Namenda)  5 mg  QHS


 PO


   1/3/22 21:00


 1/7/22 11:37 DC 1/6/22 21:06





 


 Simvastatin


  (Zocor)  40 mg  DAILY


 PO


   1/4/22 09:00


 1/8/22 12:13 DC 1/7/22 08:03





 


 Vitamin D


  (Vitamin D3)  5,000 unit  DAILY


 PO


   1/4/22 09:00


    1/10/22 09:22





 


 Glucosamine/


 Chondroitin


  (Glucosamine-Chondroitin


 500/400mg)  1 cap  DAILY


 PO


   1/4/22 09:00


    1/10/22 09:21





 


 Multivitamins/


 Calcium


  (Thera-M Plus)  1 tab  DAILY


 PO


   1/4/22 09:00


    1/10/22 09:21





 


 Acetaminophen/


 Codeine Phosphate


  (Tylenol #3)  1 tab  PRN BID  PRN


 PO


 PAIN  1/3/22 17:00


     





 


 Levofloxacin


  (Levaquin)  250 mg  DAILY06


 PO


   1/6/22 12:15


 1/8/22 12:13 DC 1/7/22 05:23





 


 Lactobacillus


 Rhamnosus


  (Culturelle)  1 cap  BID


 PO


   1/8/22 21:00


    1/10/22 20:57





 


 Simvastatin


  (Zocor)  40 mg  HS


 PO


   1/8/22 21:00


    1/10/22 20:57





 


 Doxycycline


 Hyclate


  (Vibra-Tab)  100 mg  BID


 PO


   1/8/22 21:00


 1/13/22 12:00  1/10/22 20:57





 


 Olanzapine


  (ZyPREXA ZYDIS)  2.5 mg  PRN Q2HR  PRN


 PO


 PSYCHOSIS  1/10/22 12:00


     





 


 Mirtazapine


  (Remeron)  7.5 mg  QHS


 PO


   1/10/22 21:00


    1/10/22 20:57





 


 Trazodone HCl


  (Desyrel)  50 mg  PRN QHS  PRN


 PO


 INSOMNIA  1/10/22 12:00


     











Current Medications








 Medications


  (Trade)  Dose


 Ordered  Sig/Xin


 Route


 PRN Reason  Start Time


 Stop Time Status Last Admin


Dose Admin


 


 Mirtazapine


  (Remeron)  7.5 mg  QHS


 PO


   1/10/22 21:00


    1/10/22 20:57











I have reviewed the current psychotropics carefully including drug interactions.

 Risk benefit ratio favors no change other than as noted in my dictated progress

note.





Diagnosis:


Problems:  


(1) Impulse control disorder, unspecified


(2) Anxiety disorder, unspecified


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 

disturbance


(6) Major neurocognitive disorder











DAWNA BAILEY MD                 Eddie 10, 2022 20:58

## 2022-01-10 NOTE — PDOC
Exam


Note:


Kuldip Note:


This note is a late entry for 01/08/2022 covers elements not covered in my 

initial note.





Subjective:  The patient was seen on telehealth rounds in the evening of 

01/08/2022 due to COVID-19 exposure on our unit and half the patients have been 

COVID positive and transferred to the Medical/Surgical Floor.  Discussed with 

Aleida VINSON and reviewed the chart.  Also discussed the patient with Dr. Almanzar who 

covered for me for the past 2 weeks.  Reviewed the patients history, diagnoses,

progress, work-up, labs etc.  The patient slept 7-1/4 hours previous night.  She

remains confused, oriented just to herself.  The day before she was hitting 

another patient, has difficulty eating.  She does have UTI which could be 

worsening all of the above symptoms.





Review of Systems:  Ambulation impaired, with walker, standby assist.  No CV, 

, pulmonary, eye, ENT system symptoms on review.  Reliability poor.





Mental Status Exam:  The patient is oriented to herself.  Insight and judgment, 

recent and remote memory, attention and concentration, fund of knowledge is poor

consistent with her diagnoses.





Laboratory Data:  Reviewed.





Impression:  Major neurocognitive disorder, Alzheimer, vascular with delusion, 

depression behavioral disturbance.  Anxiety disorder unspecified.  Impulse 

control disorder unspecified.  UTI.





Plan:  I have carefully reviewed the patients current psychotropics.  She is 

currently on Aricept 10 mg h.s., Namenda 10 mg daily.  Treat the UTI.  She is on

doxycycline for this.  We will consider increasing the Namenda, adding SSRI 

Zoloft depending on how she does post resolution of the UTI.  Reviewed drug 

interactions and risk-benefit ratio.  We will adjust further as clinically 

indicated.





Assessment:


Vital Signs/I&O:





                                   Vital Signs








  Date Time  Temp Pulse Resp B/P (MAP) Pulse Ox O2 Delivery O2 Flow Rate FiO2


 


1/10/22 06:40 98.3 79 18 165/89 (114) 95   


 


1/9/22 16:19      Room Air  














                                    I & O   


 


 1/9/22 1/9/22 1/10/22





 14:59 22:59 06:59


 


Intake Total 360 ml 480 ml 


 


Balance 360 ml 480 ml 








Labs:





                                Laboratory Tests








Test


 1/10/22


06:23


 


White Blood Count


 8.9 x10^3/uL


(4.0-11.0)


 


Red Blood Count


 4.01 x10^6/uL


(3.50-5.40)


 


Hemoglobin


 12.0 g/dL


(12.0-15.5)


 


Hematocrit


 36.9 %


(36.0-47.0)


 


Mean Corpuscular Volume


 92 fL ()





 


Mean Corpuscular Hemoglobin 30 pg (25-35)  


 


Mean Corpuscular Hemoglobin


Concent 33 g/dL


(31-37)


 


Red Cell Distribution Width


 12.8 %


(11.5-14.5)


 


Platelet Count


 352 x10^3/uL


(140-400)


 


Neutrophils (%) (Auto) 65 % (31-73)  


 


Lymphocytes (%) (Auto) 26 % (24-48)  


 


Monocytes (%) (Auto) 8 % (0-9)  


 


Eosinophils (%) (Auto) 2 % (0-3)  


 


Basophils (%) (Auto) 1 % (0-3)  


 


Neutrophils # (Auto)


 5.7 x10^3uL


(1.8-7.7)


 


Lymphocytes # (Auto)


 2.3 x10^3/uL


(1.0-4.8)


 


Monocytes # (Auto)


 0.7 x10^3/uL


(0.0-1.1)


 


Eosinophils # (Auto)


 0.1 x10^3/uL


(0.0-0.7)


 


Basophils # (Auto)


 0.1 x10^3/uL


(0.0-0.2)


 


Sodium Level


 145 mmol/L


(136-145)


 


Potassium Level


 3.6 mmol/L


(3.5-5.1)


 


Chloride Level


 107 mmol/L


()


 


Carbon Dioxide Level


 30 mmol/L


(21-32)


 


Anion Gap 8 (6-14)  


 


Blood Urea Nitrogen


 32 mg/dL


(7-20)  H


 


Creatinine


 0.9 mg/dL


(0.6-1.0)


 


Estimated GFR


(Cockcroft-Gault) 59.8  





 


BUN/Creatinine Ratio 36 (6-20)  H


 


Glucose Level


 101 mg/dL


(70-99)  H


 


Calcium Level


 8.8 mg/dL


(8.5-10.1)


 


Total Bilirubin


 0.3 mg/dL


(0.2-1.0)


 


Aspartate Amino Transferase


(AST) 15 U/L (15-37)





 


Alanine Aminotransferase (ALT)


 15 U/L (14-59)





 


Alkaline Phosphatase


 105 U/L


()


 


Total Protein


 6.8 g/dL


(6.4-8.2)


 


Albumin


 3.6 g/dL


(3.4-5.0)


 


Albumin/Globulin Ratio 1.1 (1.0-1.7)  











Current Medications:


Meds:





Laboratory Tests








Test


 1/10/22


06:23


 


White Blood Count 8.9 x10^3/uL 


 


Red Blood Count 4.01 x10^6/uL 


 


Hemoglobin 12.0 g/dL 


 


Hematocrit 36.9 % 


 


Mean Corpuscular Volume 92 fL 


 


Mean Corpuscular Hemoglobin 30 pg 


 


Mean Corpuscular Hemoglobin


Concent 33 g/dL 





 


Red Cell Distribution Width 12.8 % 


 


Platelet Count 352 x10^3/uL 


 


Neutrophils (%) (Auto) 65 % 


 


Lymphocytes (%) (Auto) 26 % 


 


Monocytes (%) (Auto) 8 % 


 


Eosinophils (%) (Auto) 2 % 


 


Basophils (%) (Auto) 1 % 


 


Neutrophils # (Auto) 5.7 x10^3uL 


 


Lymphocytes # (Auto) 2.3 x10^3/uL 


 


Monocytes # (Auto) 0.7 x10^3/uL 


 


Eosinophils # (Auto) 0.1 x10^3/uL 


 


Basophils # (Auto) 0.1 x10^3/uL 


 


Sodium Level 145 mmol/L 


 


Potassium Level 3.6 mmol/L 


 


Chloride Level 107 mmol/L 


 


Carbon Dioxide Level 30 mmol/L 


 


Anion Gap 8 


 


Blood Urea Nitrogen 32 mg/dL 


 


Creatinine 0.9 mg/dL 


 


Estimated GFR


(Cockcroft-Gault) 59.8 





 


BUN/Creatinine Ratio 36 


 


Glucose Level 101 mg/dL 


 


Calcium Level 8.8 mg/dL 


 


Total Bilirubin 0.3 mg/dL 


 


Aspartate Amino Transf


(AST/SGOT) 15 U/L 





 


Alanine Aminotransferase


(ALT/SGPT) 15 U/L 





 


Alkaline Phosphatase 105 U/L 


 


Total Protein 6.8 g/dL 


 


Albumin 3.6 g/dL 


 


Albumin/Globulin Ratio 1.1 








Current Medications








 Medications


  (Trade)  Dose


 Ordered  Sig/Xin


 Route


 PRN Reason  Start Time


 Stop Time Status Last Admin


Dose Admin


 


 Acetaminophen


  (Tylenol)  650 mg  PRN Q6HRS  PRN


 PO


 MILD PAIN / TEMP > 100.3'F  1/3/22 15:15


     





 


 Multi-Ingredient


 Ointment


  (Analgesic Balm)  1 dwayne  PRN QID  PRN


 TP


 MUSCLE PAIN  1/3/22 15:15


     





 


 Al Hydroxide/Mg


 Hydroxide


  (Mylanta Plus Xs)  15 ml  PRN AFTMEALHC  PRN


 PO


 DYSPEPSIA  1/3/22 15:15


     





 


 Magnesium


 Hydroxide


  (Milk Of


 Magnesia)  2,400 mg  PRN QHS  PRN


 PO


 1st choice CONSTIPATION  1/3/22 15:15


     





 


 Diphenoxylate HCl/


 Atropine


  (Lomotil)  1 tab  PRN BID  PRN


 PO


 DIARRHEA  1/3/22 16:15


     





 


 Docusate Sodium


  (Colace)  100 mg  PRN DAILY  PRN


 PO


 2ND CHOICE CONSTIPATION  1/3/22 16:15


     





 


 Donepezil HCl


  (Aricept)  10 mg  QHS


 PO


   1/3/22 21:00


 1/7/22 11:37 DC 1/6/22 21:06





 


 Losartan Potassium


  (Cozaar)  50 mg  DAILY


 PO


   1/4/22 09:00


    1/9/22 08:57





 


 Memantine


  (Namenda)  5 mg  QHS


 PO


   1/3/22 21:00


 1/7/22 11:37 DC 1/6/22 21:06





 


 Simvastatin


  (Zocor)  40 mg  DAILY


 PO


   1/4/22 09:00


 1/8/22 12:13 DC 1/7/22 08:03





 


 Vitamin D


  (Vitamin D3)  5,000 unit  DAILY


 PO


   1/4/22 09:00


    1/9/22 08:57





 


 Glucosamine/


 Chondroitin


  (Glucosamine-Chondroitin


 500/400mg)  1 cap  DAILY


 PO


   1/4/22 09:00


    1/9/22 08:58





 


 Multivitamins/


 Calcium


  (Thera-M Plus)  1 tab  DAILY


 PO


   1/4/22 09:00


    1/9/22 08:57





 


 Acetaminophen/


 Codeine Phosphate


  (Tylenol #3)  1 tab  PRN BID  PRN


 PO


 PAIN  1/3/22 17:00


     





 


 Levofloxacin


  (Levaquin)  250 mg  DAILY06


 PO


   1/6/22 12:15


 1/8/22 12:13 DC 1/7/22 05:23





 


 Lactobacillus


 Rhamnosus


  (Culturelle)  1 cap  BID


 PO


   1/8/22 21:00


    1/9/22 20:22





 


 Simvastatin


  (Zocor)  40 mg  HS


 PO


   1/8/22 21:00


    1/9/22 20:22





 


 Doxycycline


 Hyclate


  (Vibra-Tab)  100 mg  BID


 PO


   1/8/22 21:00


 1/13/22 12:00  1/9/22 20:22











I have reviewed the current psychotropics carefully including drug interactions.

 Risk benefit ratio favors no change other than as noted in my dictated progress

note.





Diagnosis:


Problems:  


(1) UTI (urinary tract infection)


(2) Impulse control disorder, unspecified


(3) Anxiety disorder, unspecified


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 

disturbance


(7) Major neurocognitive disorder











DAWNA BAILEY MD                 Eddie 10, 2022 07:34

## 2022-01-11 VITALS — DIASTOLIC BLOOD PRESSURE: 80 MMHG | SYSTOLIC BLOOD PRESSURE: 171 MMHG

## 2022-01-11 VITALS — DIASTOLIC BLOOD PRESSURE: 71 MMHG | SYSTOLIC BLOOD PRESSURE: 131 MMHG

## 2022-01-11 RX ADMIN — SIMVASTATIN SCH MG: 40 TABLET, FILM COATED ORAL at 19:26

## 2022-01-11 RX ADMIN — Medication SCH CAP: at 08:57

## 2022-01-11 RX ADMIN — DOXYCYCLINE HYCLATE SCH MG: 100 TABLET, COATED ORAL at 19:26

## 2022-01-11 RX ADMIN — MIRTAZAPINE SCH MG: 7.5 TABLET, FILM COATED ORAL at 19:26

## 2022-01-11 RX ADMIN — Medication SCH CAP: at 08:56

## 2022-01-11 RX ADMIN — VITAMIN D, TAB 1000IU (100/BT) SCH UNIT: 25 TAB at 08:56

## 2022-01-11 RX ADMIN — DOXYCYCLINE HYCLATE SCH MG: 100 TABLET, COATED ORAL at 08:56

## 2022-01-11 RX ADMIN — LOSARTAN POTASSIUM SCH MG: 50 TABLET, FILM COATED ORAL at 08:56

## 2022-01-11 RX ADMIN — MULTIPLE VITAMINS W/ MINERALS TAB SCH TAB: TAB at 08:57

## 2022-01-11 RX ADMIN — Medication SCH CAP: at 19:26

## 2022-01-11 NOTE — PDOC
Exam


Note:


Kuldip Note:


Please also refer to the separate dictated note~for this date of service 

dictated separately.~Patient seen individually. Discussed the patient with 

Nursing staff reviewed the chart.~Reviewed interim history and current 

functioning. Reviewed vital signs,~Labs/ Radiology~and current medications noted

below. Continue current treatment with the changes noted in the dictated 

addendum note





Assessment:


Vital Signs/I&O:





                                   Vital Signs








  Date Time  Temp Pulse Resp B/P (MAP) Pulse Ox O2 Delivery O2 Flow Rate FiO2


 


1/11/22 16:18 97.6 63 18 131/71 (91) 96   


 


1/9/22 16:19      Room Air  














                                    I & O   


 


 1/10/22 1/10/22 1/11/22





 15:00 23:00 07:00


 


Intake Total 240 ml 460 ml 


 


Balance 240 ml 460 ml 








Labs:





                                Laboratory Tests








Test


 1/11/22


06:00


 


SARS-CoV-2 (PCR)


 Not detected


(NOT DETECTD)











Current Medications:


Meds:





Laboratory Tests








Test


 1/11/22


06:00


 


Coronavirus (COVID-19)(PCR) Not detected 








Current Medications








 Medications


  (Trade)  Dose


 Ordered  Sig/Xin


 Route


 PRN Reason  Start Time


 Stop Time Status Last Admin


Dose Admin


 


 Acetaminophen


  (Tylenol)  650 mg  PRN Q6HRS  PRN


 PO


 MILD PAIN / TEMP > 100.3'F  1/3/22 15:15


     





 


 Multi-Ingredient


 Ointment


  (Analgesic Balm)  1 dwayne  PRN QID  PRN


 TP


 MUSCLE PAIN  1/3/22 15:15


     





 


 Al Hydroxide/Mg


 Hydroxide


  (Mylanta Plus Xs)  15 ml  PRN AFTMEALHC  PRN


 PO


 DYSPEPSIA  1/3/22 15:15


     





 


 Magnesium


 Hydroxide


  (Milk Of


 Magnesia)  2,400 mg  PRN QHS  PRN


 PO


 1st choice CONSTIPATION  1/3/22 15:15


     





 


 Diphenoxylate HCl/


 Atropine


  (Lomotil)  1 tab  PRN BID  PRN


 PO


 DIARRHEA  1/3/22 16:15


     





 


 Docusate Sodium


  (Colace)  100 mg  PRN DAILY  PRN


 PO


 2ND CHOICE CONSTIPATION  1/3/22 16:15


     





 


 Donepezil HCl


  (Aricept)  10 mg  QHS


 PO


   1/3/22 21:00


 1/7/22 11:37 DC 1/6/22 21:06





 


 Losartan Potassium


  (Cozaar)  50 mg  DAILY


 PO


   1/4/22 09:00


    1/11/22 08:56





 


 Memantine


  (Namenda)  5 mg  QHS


 PO


   1/3/22 21:00


 1/7/22 11:37 DC 1/6/22 21:06





 


 Simvastatin


  (Zocor)  40 mg  DAILY


 PO


   1/4/22 09:00


 1/8/22 12:13 DC 1/7/22 08:03





 


 Vitamin D


  (Vitamin D3)  5,000 unit  DAILY


 PO


   1/4/22 09:00


    1/11/22 08:56





 


 Glucosamine/


 Chondroitin


  (Glucosamine-Chondroitin


 500/400mg)  1 cap  DAILY


 PO


   1/4/22 09:00


    1/11/22 08:57





 


 Multivitamins/


 Calcium


  (Thera-M Plus)  1 tab  DAILY


 PO


   1/4/22 09:00


    1/11/22 08:57





 


 Acetaminophen/


 Codeine Phosphate


  (Tylenol #3)  1 tab  PRN BID  PRN


 PO


 PAIN  1/3/22 17:00


     





 


 Levofloxacin


  (Levaquin)  250 mg  DAILY06


 PO


   1/6/22 12:15


 1/8/22 12:13 DC 1/7/22 05:23





 


 Lactobacillus


 Rhamnosus


  (Culturelle)  1 cap  BID


 PO


   1/8/22 21:00


    1/11/22 19:26





 


 Simvastatin


  (Zocor)  40 mg  HS


 PO


   1/8/22 21:00


    1/11/22 19:26





 


 Doxycycline


 Hyclate


  (Vibra-Tab)  100 mg  BID


 PO


   1/8/22 21:00


 1/13/22 12:00  1/11/22 19:26





 


 Olanzapine


  (ZyPREXA ZYDIS)  2.5 mg  PRN Q2HR  PRN


 PO


 PSYCHOSIS  1/10/22 12:00


     





 


 Mirtazapine


  (Remeron)  7.5 mg  QHS


 PO


   1/10/22 21:00


    1/11/22 19:26





 


 Trazodone HCl


  (Desyrel)  50 mg  PRN QHS  PRN


 PO


 INSOMNIA  1/10/22 12:00


     











I have reviewed the current psychotropics carefully including drug interactions.

 Risk benefit ratio favors no change other than as noted in my dictated progress

note.





Diagnosis:


Problems:  


(1) Impulse control disorder, unspecified


(2) Anxiety disorder, unspecified


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


(5) Dementia of the Alzheimer's type with early onset with behavioral disturbanc

e


(6) Major neurocognitive disorder











DAWNA BAILEY MD                 Jan 11, 2022 21:14

## 2022-01-11 NOTE — NUR
TONYA received a call from Margarito, pt /DPOA, who wanted to apologize to TONYA about his 
outburst earlier.  Margarito noted "I am just frustrated and ended up taking it out on you and 
that is not okay.  I'm sorry".  TONYA accepted Margarito apology and empathized with him as TONYA noted 
his frustrations with not being able to come see pt and his upset with Covid regulations.  
Margarito wants to make sure that pt is discharge on the 20th and not a day later as he wants pt 
to come home ASAP.  TONYA informed Margarito that as long as there were no more positives, his wish 
to pick pt up on the 20th should not be an issue.   TONYA went over what the discharge would 
look like and he noted that he would like to make sure he can pick pt up after lunch.  TONYA 
will check in with Margarito later this week and will also plan to give him an update on pt 
medication changes if any arise.

## 2022-01-11 NOTE — NUR
Nurse Day Shift Note:



Pt presents with disorganized mood/affect.  Pt is disorganized and confused.  Pt received 
phone calls from her  today.  When going to retrieve the remote phone from pt, staff 
could not find the phone.  The phone was later discovered by staff in pts brief.  Pt is 
medication compliant.  Pt slept 5 hours last night.  Pt continues to be confused about food 
and how to eat it.  Will continue to encourage pt to eat meals.  Will continue to monitor.

## 2022-01-11 NOTE — NUR
Pt has been in her room sleeping most of the night. She took meds crushed in applesauce 
without difficulty. She is confused, doesn't answer questions and was resistive to labs and 
VS then quickly went back to sleeep
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
## 2022-01-11 NOTE — NUR
TONYA received call from Margarito, pt /DPOA, who wanted to see how pt is doing.  SW went 
over behaviors and noted that she does have a UTI and will finish the last dose in a couple 
of days.  Margarito asked how much longer before pt could discharge back home and TONYA noted that 
with the positive Covid cases, we were not able to discharge anyone until next week on the 
20th.  Margarito then became agitated and yelled that he is not okay with this and it's 
"horseshit" that regulations are even put into place.  "I want her home now and you guys 
have no idea what I'm going through.  I can't see her and now your telling me I have another 
10 days before I can have my wife home.  I am not okay with this and I dont' give a shit 
what the CDC guidelines are".  TONYA attempted to empathize as much as possible.  Margarito thanked 
TONYA for the report and hung up the phone.

## 2022-01-12 VITALS — SYSTOLIC BLOOD PRESSURE: 159 MMHG | DIASTOLIC BLOOD PRESSURE: 85 MMHG

## 2022-01-12 VITALS — DIASTOLIC BLOOD PRESSURE: 85 MMHG | SYSTOLIC BLOOD PRESSURE: 143 MMHG

## 2022-01-12 RX ADMIN — Medication SCH CAP: at 08:11

## 2022-01-12 RX ADMIN — DOXYCYCLINE HYCLATE SCH MG: 100 TABLET, COATED ORAL at 20:36

## 2022-01-12 RX ADMIN — MIRTAZAPINE SCH MG: 7.5 TABLET, FILM COATED ORAL at 20:36

## 2022-01-12 RX ADMIN — Medication SCH CAP: at 20:36

## 2022-01-12 RX ADMIN — LOSARTAN POTASSIUM SCH MG: 50 TABLET, FILM COATED ORAL at 08:11

## 2022-01-12 RX ADMIN — SERTRALINE SCH MG: 25 TABLET, FILM COATED ORAL at 20:36

## 2022-01-12 RX ADMIN — MULTIPLE VITAMINS W/ MINERALS TAB SCH TAB: TAB at 08:11

## 2022-01-12 RX ADMIN — VITAMIN D, TAB 1000IU (100/BT) SCH UNIT: 25 TAB at 08:12

## 2022-01-12 RX ADMIN — SIMVASTATIN SCH MG: 40 TABLET, FILM COATED ORAL at 20:36

## 2022-01-12 RX ADMIN — DOXYCYCLINE HYCLATE SCH MG: 100 TABLET, COATED ORAL at 08:11

## 2022-01-12 NOTE — NUR
Patient has been mostly drowsy, withdrawn to her bed, and poor appetite today.  She was more 
alert after supper but still ate very little at supper. Will continue to monitor and report 
to oncoming shift.

## 2022-01-12 NOTE — PDOC
Exam


Note:


Kuldip Note:


This note is a late entry for 01/11/2022 covers elements not covered in my 

initial note.





Subjective:  The patient was seen on telehealth rounds due to COVID-19 pandemic 

on the unit in the evening of 01/11/2022 with Shima VINSON, discussed and reviewed

the chart.  The patient slept 5 hours previous night.  She is extremely 

confused, unable to recognize food or eat one time at a time.  If she is given 

one item it seems a little less confusing for her.  Her  called 

frequently.  RominaMyUnfold had called me but her  is quite 

clear he wants the patient back home on 01/20 and we discussed this.  She is not

interactive, very confused.





Review of Systems:  Ambulation impaired, with walker.  No CV, , pulmonary, 

eye, ENT system symptoms on review.  Reliability poor.





Mental Status Exam:  The patient is oriented to herself.  Insight and judgment, 

recent and remote memory, attention and concentration, fund of knowledge is poor

consistent with her diagnoses.





Laboratory Data:  Reviewed.





Impression:  Major neurocognitive disorder, Alzheimer, vascular with delusion, 

depression behavioral disturbance.  Anxiety disorder unspecified.  Impulse 

control disorder unspecified.





Plan:  No change from initial note.





Assessment:


Vital Signs/I&O:





                                   Vital Signs








  Date Time  Temp Pulse Resp B/P (MAP) Pulse Ox O2 Delivery O2 Flow Rate FiO2


 


1/12/22 06:10 97.9 93 18 143/85 (104) 94   


 


1/9/22 16:19      Room Air  














                                    I & O   


 


 1/11/22 1/11/22 1/12/22





 15:00 23:00 07:00


 


Intake Total 200 ml 100 ml 


 


Balance 200 ml 100 ml 











Current Medications:


Meds:





Current Medications








 Medications


  (Trade)  Dose


 Ordered  Sig/Xin


 Route


 PRN Reason  Start Time


 Stop Time Status Last Admin


Dose Admin


 


 Acetaminophen


  (Tylenol)  650 mg  PRN Q6HRS  PRN


 PO


 MILD PAIN / TEMP > 100.3'F  1/3/22 15:15


     





 


 Multi-Ingredient


 Ointment


  (Analgesic Balm)  1 dwayne  PRN QID  PRN


 TP


 MUSCLE PAIN  1/3/22 15:15


     





 


 Al Hydroxide/Mg


 Hydroxide


  (Mylanta Plus Xs)  15 ml  PRN AFTMEALHC  PRN


 PO


 DYSPEPSIA  1/3/22 15:15


     





 


 Magnesium


 Hydroxide


  (Milk Of


 Magnesia)  2,400 mg  PRN QHS  PRN


 PO


 1st choice CONSTIPATION  1/3/22 15:15


     





 


 Diphenoxylate HCl/


 Atropine


  (Lomotil)  1 tab  PRN BID  PRN


 PO


 DIARRHEA  1/3/22 16:15


     





 


 Docusate Sodium


  (Colace)  100 mg  PRN DAILY  PRN


 PO


 2ND CHOICE CONSTIPATION  1/3/22 16:15


     





 


 Donepezil HCl


  (Aricept)  10 mg  QHS


 PO


   1/3/22 21:00


 1/7/22 11:37 DC 1/6/22 21:06





 


 Losartan Potassium


  (Cozaar)  50 mg  DAILY


 PO


   1/4/22 09:00


    1/11/22 08:56





 


 Memantine


  (Namenda)  5 mg  QHS


 PO


   1/3/22 21:00


 1/7/22 11:37 DC 1/6/22 21:06





 


 Simvastatin


  (Zocor)  40 mg  DAILY


 PO


   1/4/22 09:00


 1/8/22 12:13 DC 1/7/22 08:03





 


 Vitamin D


  (Vitamin D3)  5,000 unit  DAILY


 PO


   1/4/22 09:00


    1/11/22 08:56





 


 Glucosamine/


 Chondroitin


  (Glucosamine-Chondroitin


 500/400mg)  1 cap  DAILY


 PO


   1/4/22 09:00


    1/11/22 08:57





 


 Multivitamins/


 Calcium


  (Thera-M Plus)  1 tab  DAILY


 PO


   1/4/22 09:00


    1/11/22 08:57





 


 Acetaminophen/


 Codeine Phosphate


  (Tylenol #3)  1 tab  PRN BID  PRN


 PO


 PAIN  1/3/22 17:00


     





 


 Levofloxacin


  (Levaquin)  250 mg  DAILY06


 PO


   1/6/22 12:15


 1/8/22 12:13 DC 1/7/22 05:23





 


 Lactobacillus


 Rhamnosus


  (Culturelle)  1 cap  BID


 PO


   1/8/22 21:00


    1/11/22 19:26





 


 Simvastatin


  (Zocor)  40 mg  HS


 PO


   1/8/22 21:00


    1/11/22 19:26





 


 Doxycycline


 Hyclate


  (Vibra-Tab)  100 mg  BID


 PO


   1/8/22 21:00


 1/13/22 12:00  1/11/22 19:26





 


 Olanzapine


  (ZyPREXA ZYDIS)  2.5 mg  PRN Q2HR  PRN


 PO


 PSYCHOSIS  1/10/22 12:00


     





 


 Mirtazapine


  (Remeron)  7.5 mg  QHS


 PO


   1/10/22 21:00


    1/11/22 19:26





 


 Trazodone HCl


  (Desyrel)  50 mg  PRN QHS  PRN


 PO


 INSOMNIA  1/10/22 12:00


     











I have reviewed the current psychotropics carefully including drug interactions.

 Risk benefit ratio favors no change other than as noted in my dictated progress

note.





Diagnosis:


Problems:  


(1) Impulse control disorder, unspecified


(2) Anxiety disorder, unspecified


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 

disturbance


(6) Major neurocognitive disorder











DAWNA BAILEY MD                 Jan 12, 2022 06:40

## 2022-01-12 NOTE — PDOC
Exam


Note:


Kuldip Note:


Please also refer to the separate dictated note~for this date of service 

dictated separately.~Patient seen individually. Discussed the patient with 

Nursing staff reviewed the chart.~Reviewed interim history and current 

functioning. Reviewed vital signs,~Labs/ Radiology~and current medications noted

below. Continue current treatment with the changes noted in the dictated 

addendum note





Assessment:


Vital Signs/I&O:





                                   Vital Signs








  Date Time  Temp Pulse Resp B/P (MAP) Pulse Ox O2 Delivery O2 Flow Rate FiO2


 


1/12/22 15:21 97.6 87 16 159/85 (109) 95   


 


1/9/22 16:19      Room Air  














                                    I & O   


 


 1/11/22 1/11/22 1/12/22





 15:00 23:00 07:00


 


Intake Total 200 ml 100 ml 


 


Balance 200 ml 100 ml 











Current Medications:


Meds:





Current Medications








 Medications


  (Trade)  Dose


 Ordered  Sig/Xin


 Route


 PRN Reason  Start Time


 Stop Time Status Last Admin


Dose Admin


 


 Sertraline HCl


  (Zoloft)  25 mg  QHS


 PO


   1/12/22 21:00


 1/14/22 23:00  1/12/22 20:36











I have reviewed the current psychotropics carefully including drug interactions.

 Risk benefit ratio favors no change other than as noted in my dictated progress

note.





Diagnosis:


Problems:  


(1) Impulse control disorder, unspecified


(2) Anxiety disorder, unspecified


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 

disturbance


(6) Major neurocognitive disorder











DAWNA BAILEY MD                 Jan 12, 2022 20:57

## 2022-01-13 VITALS — DIASTOLIC BLOOD PRESSURE: 84 MMHG | SYSTOLIC BLOOD PRESSURE: 128 MMHG

## 2022-01-13 VITALS — DIASTOLIC BLOOD PRESSURE: 89 MMHG | SYSTOLIC BLOOD PRESSURE: 146 MMHG

## 2022-01-13 RX ADMIN — SIMVASTATIN SCH MG: 40 TABLET, FILM COATED ORAL at 20:29

## 2022-01-13 RX ADMIN — LOSARTAN POTASSIUM SCH MG: 50 TABLET, FILM COATED ORAL at 08:11

## 2022-01-13 RX ADMIN — MULTIPLE VITAMINS W/ MINERALS TAB SCH TAB: TAB at 08:11

## 2022-01-13 RX ADMIN — VITAMIN D, TAB 1000IU (100/BT) SCH UNIT: 25 TAB at 08:11

## 2022-01-13 RX ADMIN — MIRTAZAPINE SCH MG: 7.5 TABLET, FILM COATED ORAL at 20:28

## 2022-01-13 RX ADMIN — SERTRALINE SCH MG: 25 TABLET, FILM COATED ORAL at 20:29

## 2022-01-13 RX ADMIN — ACETAMINOPHEN PRN MG: 325 TABLET, FILM COATED ORAL at 15:35

## 2022-01-13 RX ADMIN — QUETIAPINE FUMARATE SCH MG: 25 TABLET, FILM COATED ORAL at 17:38

## 2022-01-13 RX ADMIN — Medication SCH CAP: at 08:11

## 2022-01-13 RX ADMIN — DOXYCYCLINE HYCLATE SCH MG: 100 TABLET, COATED ORAL at 08:11

## 2022-01-13 RX ADMIN — Medication SCH CAP: at 20:28

## 2022-01-13 NOTE — PDOC
Exam


Note:


Kuldip Note:


This note is a late entry for 01/12/2022 covers elements not covered in my 

initial note.





Subjective:  The patient was seen on telehealth rounds due to COVID-19 pandemic 

on the unit in the evening of 01/12/2022 with Jean VINSON, discussed and reviewed 

the chart.  The patient slept 8-1/4 hours previous night.  Overall she has been 

confused, resistive with medications.  Appetite remains poor.  She had some 

Ensure supplement.





Review of Systems:  Ambulation impaired, with walker.  No CV, , pulmonary, 

eye, ENT system symptoms on review.  Reliability poor.





Mental Status Exam:  The patient is oriented to herself.  Insight and judgment, 

recent and remote memory, attention and concentration, fund of knowledge is poor

consistent with her diagnoses.





Laboratory Data:  Reviewed.





Impression:  Major neurocognitive disorder, Alzheimer, vascular with delusion, 

depression behavioral disturbance.  Anxiety disorder unspecified.  Impulse 

control disorder unspecified.





Plan:  No change from initial note.  At this stage the patients dementia there 

is probably little benefit from using Aricept and Namenda and we will go ahead 

and stop it.  Maintain Remeron, trazodone, Zyprexa p.r.n.  Start Zoloft 25 mg a 

day for 3 days, then 50 mg a day thereafter.  Reviewed drug interactions and 

risk-benefit ratio.





Assessment:


Vital Signs/I&O:





                                   Vital Signs








  Date Time  Temp Pulse Resp B/P (MAP) Pulse Ox O2 Delivery O2 Flow Rate FiO2


 


1/13/22 16:17 97.4 95 16 128/84 (99)  Room Air  


 


1/13/22 06:37     94   














                                    I & O   


 


 1/12/22 1/12/22 1/13/22





 14:59 22:59 06:59


 


Intake Total 120 ml 360 ml 


 


Balance 120 ml 360 ml 











Current Medications:


Meds:





Current Medications








 Medications


  (Trade)  Dose


 Ordered  Sig/Xin


 Route


 PRN Reason  Start Time


 Stop Time Status Last Admin


Dose Admin


 


 Acetaminophen


  (Tylenol)  650 mg  PRN Q6HRS  PRN


 PO


 MILD PAIN / TEMP > 100.3'F  1/3/22 15:15


    1/13/22 15:35





 


 Multi-Ingredient


 Ointment


  (Analgesic Balm)  1 dwayne  PRN QID  PRN


 TP


 MUSCLE PAIN  1/3/22 15:15


     





 


 Al Hydroxide/Mg


 Hydroxide


  (Mylanta Plus Xs)  15 ml  PRN AFTMEALHC  PRN


 PO


 DYSPEPSIA  1/3/22 15:15


     





 


 Magnesium


 Hydroxide


  (Milk Of


 Magnesia)  2,400 mg  PRN QHS  PRN


 PO


 1st choice CONSTIPATION  1/3/22 15:15


     





 


 Diphenoxylate HCl/


 Atropine


  (Lomotil)  1 tab  PRN BID  PRN


 PO


 DIARRHEA  1/3/22 16:15


     





 


 Docusate Sodium


  (Colace)  100 mg  PRN DAILY  PRN


 PO


 2ND CHOICE CONSTIPATION  1/3/22 16:15


     





 


 Donepezil HCl


  (Aricept)  10 mg  QHS


 PO


   1/3/22 21:00


 1/7/22 11:37 DC 1/6/22 21:06





 


 Losartan Potassium


  (Cozaar)  50 mg  DAILY


 PO


   1/4/22 09:00


    1/13/22 08:11





 


 Memantine


  (Namenda)  5 mg  QHS


 PO


   1/3/22 21:00


 1/7/22 11:37 DC 1/6/22 21:06





 


 Simvastatin


  (Zocor)  40 mg  DAILY


 PO


   1/4/22 09:00


 1/8/22 12:13 DC 1/7/22 08:03





 


 Vitamin D


  (Vitamin D3)  5,000 unit  DAILY


 PO


   1/4/22 09:00


    1/13/22 08:11





 


 Glucosamine/


 Chondroitin


  (Glucosamine-Chondroitin


 500/400mg)  1 cap  DAILY


 PO


   1/4/22 09:00


    1/13/22 08:11





 


 Multivitamins/


 Calcium


  (Thera-M Plus)  1 tab  DAILY


 PO


   1/4/22 09:00


    1/13/22 08:11





 


 Acetaminophen/


 Codeine Phosphate


  (Tylenol #3)  1 tab  PRN BID  PRN


 PO


 PAIN  1/3/22 17:00


     





 


 Levofloxacin


  (Levaquin)  250 mg  DAILY06


 PO


   1/6/22 12:15


 1/8/22 12:13 DC 1/7/22 05:23





 


 Lactobacillus


 Rhamnosus


  (Culturelle)  1 cap  BID


 PO


   1/8/22 21:00


    1/13/22 20:28





 


 Simvastatin


  (Zocor)  40 mg  HS


 PO


   1/8/22 21:00


    1/13/22 20:29





 


 Doxycycline


 Hyclate


  (Vibra-Tab)  100 mg  BID


 PO


   1/8/22 21:00


 1/13/22 12:00 DC 1/13/22 08:11





 


 Olanzapine


  (ZyPREXA ZYDIS)  2.5 mg  PRN Q2HR  PRN


 PO


 PSYCHOSIS  1/10/22 12:00


     





 


 Mirtazapine


  (Remeron)  7.5 mg  QHS


 PO


   1/10/22 21:00


    1/13/22 20:28





 


 Trazodone HCl


  (Desyrel)  50 mg  PRN QHS  PRN


 PO


 INSOMNIA  1/10/22 12:00


     





 


 Sertraline HCl


  (Zoloft)  25 mg  QHS


 PO


   1/12/22 21:00


 1/14/22 23:00  1/13/22 20:29





 


 Sertraline HCl


  (Zoloft)  50 mg  QHS


 PO


   1/15/22 21:00


     





 


 Quetiapine


 Fumarate


  (SEROquel)  12.5 mg  1700


 PO


   1/13/22 17:00


    1/13/22 17:38











Current Medications








 Medications


  (Trade)  Dose


 Ordered  Sig/Xin


 Route


 PRN Reason  Start Time


 Stop Time Status Last Admin


Dose Admin


 


 Quetiapine


 Fumarate


  (SEROquel)  12.5 mg  1700


 PO


   1/13/22 17:00


    1/13/22 17:38











I have reviewed the current psychotropics carefully including drug interactions.

 Risk benefit ratio favors no change other than as noted in my dictated progress

note.





Diagnosis:


Problems:  


(1) Impulse control disorder, unspecified


(2) Anxiety disorder, unspecified


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 

disturbance


(6) Major neurocognitive disorder











DAWNA BAILEY MD                 Jan 13, 2022 21:12
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
## 2022-01-13 NOTE — PDOC
Exam


Note:


Kuldip Note:


Please also refer to the separate dictated note~for this date of service 

dictated separately.~Patient seen individually. Discussed the patient with 

Nursing staff reviewed the chart.~Reviewed interim history and current 

functioning. Reviewed vital signs,~Labs/ Radiology~and current medications noted

below. Continue current treatment with the changes noted in the dictated 

addendum note





Assessment:


Vital Signs/I&O:





                                   Vital Signs








  Date Time  Temp Pulse Resp B/P (MAP) Pulse Ox O2 Delivery O2 Flow Rate FiO2


 


1/13/22 16:17 97.4 95 16 128/84 (99)  Room Air  


 


1/13/22 06:37     94   














                                    I & O   


 


 1/12/22 1/12/22 1/13/22





 15:00 23:00 07:00


 


Intake Total 120 ml 360 ml 


 


Balance 120 ml 360 ml 











Current Medications:


Meds:





Current Medications








 Medications


  (Trade)  Dose


 Ordered  Sig/Xin


 Route


 PRN Reason  Start Time


 Stop Time Status Last Admin


Dose Admin


 


 Acetaminophen


  (Tylenol)  650 mg  PRN Q6HRS  PRN


 PO


 MILD PAIN / TEMP > 100.3'F  1/3/22 15:15


    1/13/22 15:35





 


 Multi-Ingredient


 Ointment


  (Analgesic Balm)  1 dwayne  PRN QID  PRN


 TP


 MUSCLE PAIN  1/3/22 15:15


     





 


 Al Hydroxide/Mg


 Hydroxide


  (Mylanta Plus Xs)  15 ml  PRN AFTMEALHC  PRN


 PO


 DYSPEPSIA  1/3/22 15:15


     





 


 Magnesium


 Hydroxide


  (Milk Of


 Magnesia)  2,400 mg  PRN QHS  PRN


 PO


 1st choice CONSTIPATION  1/3/22 15:15


     





 


 Diphenoxylate HCl/


 Atropine


  (Lomotil)  1 tab  PRN BID  PRN


 PO


 DIARRHEA  1/3/22 16:15


     





 


 Docusate Sodium


  (Colace)  100 mg  PRN DAILY  PRN


 PO


 2ND CHOICE CONSTIPATION  1/3/22 16:15


     





 


 Donepezil HCl


  (Aricept)  10 mg  QHS


 PO


   1/3/22 21:00


 1/7/22 11:37 DC 1/6/22 21:06





 


 Losartan Potassium


  (Cozaar)  50 mg  DAILY


 PO


   1/4/22 09:00


    1/13/22 08:11





 


 Memantine


  (Namenda)  5 mg  QHS


 PO


   1/3/22 21:00


 1/7/22 11:37 DC 1/6/22 21:06





 


 Simvastatin


  (Zocor)  40 mg  DAILY


 PO


   1/4/22 09:00


 1/8/22 12:13 DC 1/7/22 08:03





 


 Vitamin D


  (Vitamin D3)  5,000 unit  DAILY


 PO


   1/4/22 09:00


    1/13/22 08:11





 


 Glucosamine/


 Chondroitin


  (Glucosamine-Chondroitin


 500/400mg)  1 cap  DAILY


 PO


   1/4/22 09:00


    1/13/22 08:11





 


 Multivitamins/


 Calcium


  (Thera-M Plus)  1 tab  DAILY


 PO


   1/4/22 09:00


    1/13/22 08:11





 


 Acetaminophen/


 Codeine Phosphate


  (Tylenol #3)  1 tab  PRN BID  PRN


 PO


 PAIN  1/3/22 17:00


     





 


 Levofloxacin


  (Levaquin)  250 mg  DAILY06


 PO


   1/6/22 12:15


 1/8/22 12:13 DC 1/7/22 05:23





 


 Lactobacillus


 Rhamnosus


  (Culturelle)  1 cap  BID


 PO


   1/8/22 21:00


    1/13/22 20:28





 


 Simvastatin


  (Zocor)  40 mg  HS


 PO


   1/8/22 21:00


    1/13/22 20:29





 


 Doxycycline


 Hyclate


  (Vibra-Tab)  100 mg  BID


 PO


   1/8/22 21:00


 1/13/22 12:00 DC 1/13/22 08:11





 


 Olanzapine


  (ZyPREXA ZYDIS)  2.5 mg  PRN Q2HR  PRN


 PO


 PSYCHOSIS  1/10/22 12:00


     





 


 Mirtazapine


  (Remeron)  7.5 mg  QHS


 PO


   1/10/22 21:00


    1/13/22 20:28





 


 Trazodone HCl


  (Desyrel)  50 mg  PRN QHS  PRN


 PO


 INSOMNIA  1/10/22 12:00


     





 


 Sertraline HCl


  (Zoloft)  25 mg  QHS


 PO


   1/12/22 21:00


 1/14/22 23:00  1/13/22 20:29





 


 Sertraline HCl


  (Zoloft)  50 mg  QHS


 PO


   1/15/22 21:00


     





 


 Quetiapine


 Fumarate


  (SEROquel)  12.5 mg  1700


 PO


   1/13/22 17:00


    1/13/22 17:38











Current Medications








 Medications


  (Trade)  Dose


 Ordered  Sig/Xin


 Route


 PRN Reason  Start Time


 Stop Time Status Last Admin


Dose Admin


 


 Quetiapine


 Fumarate


  (SEROquel)  12.5 mg  1700


 PO


   1/13/22 17:00


    1/13/22 17:38











I have reviewed the current psychotropics carefully including drug interactions.

 Risk benefit ratio favors no change other than as noted in my dictated progress

note.





Diagnosis:


Problems:  


(1) Impulse control disorder, unspecified


(2) Anxiety disorder, unspecified


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 

disturbance


(6) Major neurocognitive disorder











DAWNA BAILEY MD                 Jan 13, 2022 21:13

## 2022-01-14 VITALS — DIASTOLIC BLOOD PRESSURE: 74 MMHG | SYSTOLIC BLOOD PRESSURE: 150 MMHG

## 2022-01-14 VITALS — SYSTOLIC BLOOD PRESSURE: 113 MMHG | DIASTOLIC BLOOD PRESSURE: 75 MMHG

## 2022-01-14 RX ADMIN — Medication SCH CAP: at 07:31

## 2022-01-14 RX ADMIN — SIMVASTATIN SCH MG: 40 TABLET, FILM COATED ORAL at 20:35

## 2022-01-14 RX ADMIN — VITAMIN D, TAB 1000IU (100/BT) SCH UNIT: 25 TAB at 07:31

## 2022-01-14 RX ADMIN — QUETIAPINE FUMARATE SCH MG: 25 TABLET, FILM COATED ORAL at 16:54

## 2022-01-14 RX ADMIN — MIRTAZAPINE SCH MG: 7.5 TABLET, FILM COATED ORAL at 20:34

## 2022-01-14 RX ADMIN — LOSARTAN POTASSIUM SCH MG: 50 TABLET, FILM COATED ORAL at 07:31

## 2022-01-14 RX ADMIN — SERTRALINE SCH MG: 25 TABLET, FILM COATED ORAL at 20:34

## 2022-01-14 RX ADMIN — Medication SCH CAP: at 20:35

## 2022-01-14 RX ADMIN — MULTIPLE VITAMINS W/ MINERALS TAB SCH TAB: TAB at 07:32

## 2022-01-14 NOTE — PDOC
Exam


Note:


Kuldip Note:


Please also refer to the separate dictated note~for this date of service 

dictated separately.~Patient seen individually. Discussed the patient with 

Nursing staff reviewed the chart.~Reviewed interim history and current 

functioning. Reviewed vital signs,~Labs/ Radiology~and current medications noted

below. Continue current treatment with the changes noted in the dictated 

addendum note





Assessment:


Vital Signs/I&O:





                                   Vital Signs








  Date Time  Temp Pulse Resp B/P (MAP) Pulse Ox O2 Delivery O2 Flow Rate FiO2


 


1/14/22 16:11 97.9 76 20 113/75 (88) 96 Room Air  














                                    I & O   


 


 1/13/22 1/13/22 1/14/22





 15:00 23:00 07:00


 


Intake Total 180 ml 0 ml 


 


Balance 180 ml 0 ml 








Labs:





                                Laboratory Tests








Test


 1/14/22


06:00


 


SARS-CoV-2 (PCR)


 Not detected


(NOT DETECTD)











Current Medications:


Meds:





Laboratory Tests








Test


 1/14/22


06:00


 


Coronavirus (COVID-19)(PCR) Not detected 








Current Medications








 Medications


  (Trade)  Dose


 Ordered  Sig/Xin


 Route


 PRN Reason  Start Time


 Stop Time Status Last Admin


Dose Admin


 


 Acetaminophen


  (Tylenol)  650 mg  PRN Q6HRS  PRN


 PO


 MILD PAIN / TEMP > 100.3'F  1/3/22 15:15


    1/13/22 15:35





 


 Multi-Ingredient


 Ointment


  (Analgesic Balm)  1 dwayne  PRN QID  PRN


 TP


 MUSCLE PAIN  1/3/22 15:15


     





 


 Al Hydroxide/Mg


 Hydroxide


  (Mylanta Plus Xs)  15 ml  PRN AFTMEALHC  PRN


 PO


 DYSPEPSIA  1/3/22 15:15


     





 


 Magnesium


 Hydroxide


  (Milk Of


 Magnesia)  2,400 mg  PRN QHS  PRN


 PO


 1st choice CONSTIPATION  1/3/22 15:15


     





 


 Diphenoxylate HCl/


 Atropine


  (Lomotil)  1 tab  PRN BID  PRN


 PO


 DIARRHEA  1/3/22 16:15


     





 


 Docusate Sodium


  (Colace)  100 mg  PRN DAILY  PRN


 PO


 2ND CHOICE CONSTIPATION  1/3/22 16:15


     





 


 Donepezil HCl


  (Aricept)  10 mg  QHS


 PO


   1/3/22 21:00


 1/7/22 11:37 DC 1/6/22 21:06





 


 Losartan Potassium


  (Cozaar)  50 mg  DAILY


 PO


   1/4/22 09:00


    1/14/22 07:31





 


 Memantine


  (Namenda)  5 mg  QHS


 PO


   1/3/22 21:00


 1/7/22 11:37 DC 1/6/22 21:06





 


 Simvastatin


  (Zocor)  40 mg  DAILY


 PO


   1/4/22 09:00


 1/8/22 12:13 DC 1/7/22 08:03





 


 Vitamin D


  (Vitamin D3)  5,000 unit  DAILY


 PO


   1/4/22 09:00


    1/14/22 07:31





 


 Glucosamine/


 Chondroitin


  (Glucosamine-Chondroitin


 500/400mg)  1 cap  DAILY


 PO


   1/4/22 09:00


    1/14/22 07:31





 


 Multivitamins/


 Calcium


  (Thera-M Plus)  1 tab  DAILY


 PO


   1/4/22 09:00


    1/14/22 07:32





 


 Acetaminophen/


 Codeine Phosphate


  (Tylenol #3)  1 tab  PRN BID  PRN


 PO


 PAIN  1/3/22 17:00


     





 


 Levofloxacin


  (Levaquin)  250 mg  DAILY06


 PO


   1/6/22 12:15


 1/8/22 12:13 DC 1/7/22 05:23





 


 Lactobacillus


 Rhamnosus


  (Culturelle)  1 cap  BID


 PO


   1/8/22 21:00


    1/14/22 20:35





 


 Simvastatin


  (Zocor)  40 mg  HS


 PO


   1/8/22 21:00


    1/14/22 20:35





 


 Doxycycline


 Hyclate


  (Vibra-Tab)  100 mg  BID


 PO


   1/8/22 21:00


 1/13/22 12:00 DC 1/13/22 08:11





 


 Olanzapine


  (ZyPREXA ZYDIS)  2.5 mg  PRN Q2HR  PRN


 PO


 PSYCHOSIS  1/10/22 12:00


    1/14/22 12:22





 


 Mirtazapine


  (Remeron)  7.5 mg  QHS


 PO


   1/10/22 21:00


    1/14/22 20:34





 


 Trazodone HCl


  (Desyrel)  50 mg  PRN QHS  PRN


 PO


 INSOMNIA  1/10/22 12:00


     





 


 Sertraline HCl


  (Zoloft)  25 mg  QHS


 PO


   1/12/22 21:00


 1/14/22 23:00  1/14/22 20:34





 


 Sertraline HCl


  (Zoloft)  50 mg  QHS


 PO


   1/15/22 21:00


     





 


 Quetiapine


 Fumarate


  (SEROquel)  12.5 mg  1700


 PO


   1/13/22 17:00


    1/14/22 16:54











I have reviewed the current psychotropics carefully including drug interactions.

 Risk benefit ratio favors no change other than as noted in my dictated progress

note.





Diagnosis:


Problems:  


(1) Impulse control disorder, unspecified


(2) Anxiety disorder, unspecified


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 

disturbance


(6) Major neurocognitive disorder











DAWNA BAILEY MD                 Jan 14, 2022 21:17

## 2022-01-14 NOTE — NUR
Patient has been in her room this shift.  She has been in bed, sleeping at times, awake at 
others.  Patient was compliant with medications crushed in pudding and she drank 240 ml 
water after taking medications.  Nurse guided spoon into patients mouth, she had some 
difficulty using a straw but was eventually able to drink from it. 

No adverse behaviors noted this shift.

## 2022-01-14 NOTE — NUR
Nursing Note

Pt highly combative with any interactions especially ADL's.  Not eating doesn't understand 
what food or utensils are, acts as though she doesn't recognize it as food, rubs jelly into 
her fingers and hands as if it is lotion.  Gets extremely irritable when redirected, and 
mocks staff condescendingly when we attempt to help or guide her.  She misperceives staff 
helping her as a threat and becomes very aggressive with interactions. Pt in bed after 
breakfast.

## 2022-01-14 NOTE — NUR
Patient rested in her room for most of the afternoon.  She was up in her chair for a portion 
of the day too.  She is very confused and has difficulty with responding to simple questions 
or direction.  Patient needs a quiet and concrete approach to directives for compliance with 
medications and fluid intake.  She fiddles and fidgets with items and has little to know 
intentional behavior.  She does not respond with speech very well but will use facial 
gestures with eyes, head nods for yes, and shaking her head for no to simple yes and no 
communications related to her needs and wants.  She took her medication at 1700 crushed in 
chocolate pudding without resistance but needed patient prompting.  After swallowing the 
pudding i gave her as cup of water that to my surprise she very clearly said "thank you".

## 2022-01-15 VITALS — DIASTOLIC BLOOD PRESSURE: 69 MMHG | SYSTOLIC BLOOD PRESSURE: 150 MMHG

## 2022-01-15 VITALS — DIASTOLIC BLOOD PRESSURE: 70 MMHG | SYSTOLIC BLOOD PRESSURE: 138 MMHG

## 2022-01-15 RX ADMIN — QUETIAPINE FUMARATE SCH MG: 25 TABLET, FILM COATED ORAL at 17:02

## 2022-01-15 RX ADMIN — ACETAMINOPHEN PRN MG: 325 TABLET, FILM COATED ORAL at 11:01

## 2022-01-15 RX ADMIN — LOSARTAN POTASSIUM SCH MG: 50 TABLET, FILM COATED ORAL at 08:18

## 2022-01-15 RX ADMIN — SERTRALINE HYDROCHLORIDE SCH MG: 50 TABLET ORAL at 20:07

## 2022-01-15 RX ADMIN — MULTIPLE VITAMINS W/ MINERALS TAB SCH TAB: TAB at 08:18

## 2022-01-15 RX ADMIN — MIRTAZAPINE SCH MG: 7.5 TABLET, FILM COATED ORAL at 20:05

## 2022-01-15 RX ADMIN — Medication SCH CAP: at 08:17

## 2022-01-15 RX ADMIN — Medication SCH CAP: at 08:18

## 2022-01-15 RX ADMIN — VITAMIN D, TAB 1000IU (100/BT) SCH UNIT: 25 TAB at 08:18

## 2022-01-15 RX ADMIN — Medication SCH CAP: at 20:05

## 2022-01-15 RX ADMIN — SIMVASTATIN SCH MG: 40 TABLET, FILM COATED ORAL at 20:05

## 2022-01-15 NOTE — NUR
Patient has been laying in her bed, on her side or face down.  She was awake and denied pain 
when asked.  She spoke to her  briefly on the phone, their conversation sounded 
mostly one sided, she did not participate very much.

Patient is very confused and needed much prompting and direction to take her medications 
crushed in pudding. She drank a cup of water after medications.



It was reported that patient has been having trouble using fork and tried to use a comb to 
eat her mashed potatoes at dinner.  Relayed this to Dr Oliveros and we will change the dietary 
order to finger foods.   Her  stated that she "did not have trouble, before this all 
started".

## 2022-01-15 NOTE — PDOC
Exam


Note:


Kuldip Note:


Please also refer to the separate dictated note~for this date of service 

dictated separately.~Patient seen individually. Discussed the patient with 

Nursing staff reviewed the chart.~Reviewed interim history and current 

functioning. Reviewed vital signs,~Labs/ Radiology~and current medications noted

below. Continue current treatment with the changes noted in the dictated 

addendum note





Assessment:


Vital Signs/I&O:





                                   Vital Signs








  Date Time  Temp Pulse Resp B/P (MAP) Pulse Ox O2 Delivery O2 Flow Rate FiO2


 


1/15/22 16:04 97.2 74 18 138/70 (92) 95 Room Air  














                                    I & O   


 


 1/14/22 1/14/22 1/15/22





 15:00 23:00 07:00


 


Intake Total 600 ml 240 ml 


 


Balance 600 ml 240 ml 











Current Medications:


Meds:





Current Medications








 Medications


  (Trade)  Dose


 Ordered  Sig/Xin


 Route


 PRN Reason  Start Time


 Stop Time Status Last Admin


Dose Admin


 


 Acetaminophen


  (Tylenol)  650 mg  PRN Q6HRS  PRN


 PO


 MILD PAIN / TEMP > 100.3'F  1/3/22 15:15


    1/15/22 11:01





 


 Multi-Ingredient


 Ointment


  (Analgesic Balm)  1 dwayne  PRN QID  PRN


 TP


 MUSCLE PAIN  1/3/22 15:15


     





 


 Al Hydroxide/Mg


 Hydroxide


  (Mylanta Plus Xs)  15 ml  PRN AFTMEALHC  PRN


 PO


 DYSPEPSIA  1/3/22 15:15


     





 


 Magnesium


 Hydroxide


  (Milk Of


 Magnesia)  2,400 mg  PRN QHS  PRN


 PO


 1st choice CONSTIPATION  1/3/22 15:15


     





 


 Diphenoxylate HCl/


 Atropine


  (Lomotil)  1 tab  PRN BID  PRN


 PO


 DIARRHEA  1/3/22 16:15


     





 


 Docusate Sodium


  (Colace)  100 mg  PRN DAILY  PRN


 PO


 2ND CHOICE CONSTIPATION  1/3/22 16:15


     





 


 Donepezil HCl


  (Aricept)  10 mg  QHS


 PO


   1/3/22 21:00


 1/7/22 11:37 DC 1/6/22 21:06





 


 Losartan Potassium


  (Cozaar)  50 mg  DAILY


 PO


   1/4/22 09:00


    1/15/22 08:18





 


 Memantine


  (Namenda)  5 mg  QHS


 PO


   1/3/22 21:00


 1/7/22 11:37 DC 1/6/22 21:06





 


 Simvastatin


  (Zocor)  40 mg  DAILY


 PO


   1/4/22 09:00


 1/8/22 12:13 DC 1/7/22 08:03





 


 Vitamin D


  (Vitamin D3)  5,000 unit  DAILY


 PO


   1/4/22 09:00


    1/15/22 08:18





 


 Glucosamine/


 Chondroitin


  (Glucosamine-Chondroitin


 500/400mg)  1 cap  DAILY


 PO


   1/4/22 09:00


    1/15/22 08:17





 


 Multivitamins/


 Calcium


  (Thera-M Plus)  1 tab  DAILY


 PO


   1/4/22 09:00


    1/15/22 08:18





 


 Acetaminophen/


 Codeine Phosphate


  (Tylenol #3)  1 tab  PRN BID  PRN


 PO


 PAIN  1/3/22 17:00


     





 


 Levofloxacin


  (Levaquin)  250 mg  DAILY06


 PO


   1/6/22 12:15


 1/8/22 12:13 DC 1/7/22 05:23





 


 Lactobacillus


 Rhamnosus


  (Culturelle)  1 cap  BID


 PO


   1/8/22 21:00


    1/15/22 20:05





 


 Simvastatin


  (Zocor)  40 mg  HS


 PO


   1/8/22 21:00


    1/15/22 20:05





 


 Doxycycline


 Hyclate


  (Vibra-Tab)  100 mg  BID


 PO


   1/8/22 21:00


 1/13/22 12:00 DC 1/13/22 08:11





 


 Olanzapine


  (ZyPREXA ZYDIS)  2.5 mg  PRN Q2HR  PRN


 PO


 PSYCHOSIS  1/10/22 12:00


    1/14/22 12:22





 


 Mirtazapine


  (Remeron)  7.5 mg  QHS


 PO


   1/10/22 21:00


    1/15/22 20:05





 


 Trazodone HCl


  (Desyrel)  50 mg  PRN QHS  PRN


 PO


 INSOMNIA  1/10/22 12:00


     





 


 Sertraline HCl


  (Zoloft)  25 mg  QHS


 PO


   1/12/22 21:00


 1/14/22 23:00 DC 1/14/22 20:34





 


 Sertraline HCl


  (Zoloft)  50 mg  QHS


 PO


   1/15/22 21:00


    1/15/22 20:07





 


 Quetiapine


 Fumarate


  (SEROquel)  12.5 mg  1700


 PO


   1/13/22 17:00


    1/15/22 17:02











Current Medications








 Medications


  (Trade)  Dose


 Ordered  Sig/Xin


 Route


 PRN Reason  Start Time


 Stop Time Status Last Admin


Dose Admin


 


 Sertraline HCl


  (Zoloft)  50 mg  QHS


 PO


   1/15/22 21:00


    1/15/22 20:07











I have reviewed the current psychotropics carefully including drug interactions.

 Risk benefit ratio favors no change other than as noted in my dictated progress

note.





Diagnosis:


Problems:  


(1) Impulse control disorder, unspecified


(2) Anxiety disorder, unspecified


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 

disturbance


(6) Major neurocognitive disorder











DAWNA BAILEY MD                 Eddie 15, 2022 21:34

## 2022-01-15 NOTE — NUR
Nursing note:

Pt laying in bed at time of AM med pass and assessment. She is pleasant, med compliant and 
cooperative. Pt is very soft spoken and difficult to hear her responses to questions. Pt 
appears to be very drowsy at this time, so was allowed to stay in bed for therapeutic sleep. 
When getting pt ready for the day a little later, pt was groaning with the slightest 
movement and did not wish for her bed to be raised further. PRN tylenol given for reports of 
back pain. Will continue to monitor.

## 2022-01-15 NOTE — NUR
Nursing note:

Pt very resistive with shower, attempting to grab at shower head and was yelling and 
swinging at staff as they are attempting to redress her. Pt eventually able to be dressed 
and taken back to her room to prepare for lunch. Will continue to monitor.

## 2022-01-15 NOTE — PDOC
Exam


Note:


Kuldip Note:


This note is a late entry for 01/13/2022 covers elements not covered in my 

initial note.





Subjective:  The patient was seen individually at treatment team meeting in the 

morning on 01/13/2022 with Romina Anguiano, Laila Renteria, and Cristela Eddy 

(), Isabel, activity therapy, and Armando VINSON, discussed

and reviewed the chart.  Reviewed interim history and current functioning.  The 

patient slept 6-3/4 hours previous night.  Appetite is 40%.  She is less drowsy.

 Previous evening she was resistive with cares, somewhat mean and confused per 

nursing report, not cooperative at all, little better during the day today.  She

does get more agitated late in the evening.





Review of Systems:  Gait unsteady, with walker.  No CV, , pulmonary, eye, ENT 

system symptoms on review.  Reliability poor.





Mental Status Exam:  The patient is oriented to herself.  Insight and judgment, 

recent and remote memory, attention and concentration, fund of knowledge is poor

consistent with her diagnoses.





Laboratory Data:  Reviewed.





Impression:  Major neurocognitive disorder, Alzheimer, vascular with delusion, 

depression behavioral disturbance.  Anxiety disorder unspecified.  Impulse 

control disorder unspecified.





Plan:  Given her psychotic symptoms, agitation and anxiety, irritability and 

increased confusion in the evening, we will go ahead and add Seroquel 12.5 mg at

5 p.m.  Rest unchanged per initial note.





Assessment:


Vital Signs/I&O:





                                   Vital Signs








  Date Time  Temp Pulse Resp B/P (MAP) Pulse Ox O2 Delivery O2 Flow Rate FiO2


 


1/15/22 05:50 97.4 73 16 150/69 (96) 94   


 


1/14/22 16:11      Room Air  














                                    I & O   


 


 1/14/22 1/14/22 1/15/22





 15:00 23:00 07:00


 


Intake Total 600 ml 240 ml 


 


Balance 600 ml 240 ml 











Current Medications:


Meds:





Current Medications








 Medications


  (Trade)  Dose


 Ordered  Sig/Xin


 Route


 PRN Reason  Start Time


 Stop Time Status Last Admin


Dose Admin


 


 Acetaminophen


  (Tylenol)  650 mg  PRN Q6HRS  PRN


 PO


 MILD PAIN / TEMP > 100.3'F  1/3/22 15:15


    1/13/22 15:35





 


 Multi-Ingredient


 Ointment


  (Analgesic Balm)  1 dwayne  PRN QID  PRN


 TP


 MUSCLE PAIN  1/3/22 15:15


     





 


 Al Hydroxide/Mg


 Hydroxide


  (Mylanta Plus Xs)  15 ml  PRN AFTMEALHC  PRN


 PO


 DYSPEPSIA  1/3/22 15:15


     





 


 Magnesium


 Hydroxide


  (Milk Of


 Magnesia)  2,400 mg  PRN QHS  PRN


 PO


 1st choice CONSTIPATION  1/3/22 15:15


     





 


 Diphenoxylate HCl/


 Atropine


  (Lomotil)  1 tab  PRN BID  PRN


 PO


 DIARRHEA  1/3/22 16:15


     





 


 Docusate Sodium


  (Colace)  100 mg  PRN DAILY  PRN


 PO


 2ND CHOICE CONSTIPATION  1/3/22 16:15


     





 


 Donepezil HCl


  (Aricept)  10 mg  QHS


 PO


   1/3/22 21:00


 1/7/22 11:37 DC 1/6/22 21:06





 


 Losartan Potassium


  (Cozaar)  50 mg  DAILY


 PO


   1/4/22 09:00


    1/14/22 07:31





 


 Memantine


  (Namenda)  5 mg  QHS


 PO


   1/3/22 21:00


 1/7/22 11:37 DC 1/6/22 21:06





 


 Simvastatin


  (Zocor)  40 mg  DAILY


 PO


   1/4/22 09:00


 1/8/22 12:13 DC 1/7/22 08:03





 


 Vitamin D


  (Vitamin D3)  5,000 unit  DAILY


 PO


   1/4/22 09:00


    1/14/22 07:31





 


 Glucosamine/


 Chondroitin


  (Glucosamine-Chondroitin


 500/400mg)  1 cap  DAILY


 PO


   1/4/22 09:00


    1/14/22 07:31





 


 Multivitamins/


 Calcium


  (Thera-M Plus)  1 tab  DAILY


 PO


   1/4/22 09:00


    1/14/22 07:32





 


 Acetaminophen/


 Codeine Phosphate


  (Tylenol #3)  1 tab  PRN BID  PRN


 PO


 PAIN  1/3/22 17:00


     





 


 Levofloxacin


  (Levaquin)  250 mg  DAILY06


 PO


   1/6/22 12:15


 1/8/22 12:13 DC 1/7/22 05:23





 


 Lactobacillus


 Rhamnosus


  (Culturelle)  1 cap  BID


 PO


   1/8/22 21:00


    1/14/22 20:35





 


 Simvastatin


  (Zocor)  40 mg  HS


 PO


   1/8/22 21:00


    1/14/22 20:35





 


 Doxycycline


 Hyclate


  (Vibra-Tab)  100 mg  BID


 PO


   1/8/22 21:00


 1/13/22 12:00 DC 1/13/22 08:11





 


 Olanzapine


  (ZyPREXA ZYDIS)  2.5 mg  PRN Q2HR  PRN


 PO


 PSYCHOSIS  1/10/22 12:00


    1/14/22 12:22





 


 Mirtazapine


  (Remeron)  7.5 mg  QHS


 PO


   1/10/22 21:00


    1/14/22 20:34





 


 Trazodone HCl


  (Desyrel)  50 mg  PRN QHS  PRN


 PO


 INSOMNIA  1/10/22 12:00


     





 


 Sertraline HCl


  (Zoloft)  25 mg  QHS


 PO


   1/12/22 21:00


 1/14/22 23:00 DC 1/14/22 20:34





 


 Sertraline HCl


  (Zoloft)  50 mg  QHS


 PO


   1/15/22 21:00


     





 


 Quetiapine


 Fumarate


  (SEROquel)  12.5 mg  1700


 PO


   1/13/22 17:00


    1/14/22 16:54











I have reviewed the current psychotropics carefully including drug interactions.

 Risk benefit ratio favors no change other than as noted in my dictated progress

note.





Diagnosis:


Problems:  


(1) Impulse control disorder, unspecified


(2) Anxiety disorder, unspecified


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 

disturbance


(6) Major neurocognitive disorder











DAWNA BAILEY MD                 Eddie 15, 2022 07:09

## 2022-01-15 NOTE — PDOC
Exam


Note:


Kuldip Note:


This note is a late entry for 01/14/2022 covers elements not covered in my 

initial note.





Subjective:  The patient was seen on telehealth rounds on 01/14/2022 with 

Elvia VINSON, discussed and reviewed the chart.  The patient slept 6-3/4 hours 

previous night.  She remains confused, got agitated in the afternoon.  Received 

Zyprexa at 1 p.m., better after that.





Review of Systems:  Ambulation impaired, with walker.  No CV, , pulmonary, 

eye, ENT system symptoms on review.  Reliability poor.





Mental Status Exam:  The patient is oriented to herself.  Insight and judgment, 

recent and remote memory, attention and concentration, fund of knowledge is poor

consistent with her diagnoses.





Laboratory Data:  Reviewed.





Impression:  Major neurocognitive disorder, Alzheimer, vascular with delusion, 

depression behavioral disturbance.  Anxiety disorder unspecified.  Impulse 

control disorder unspecified.





Plan:  No change from initial note.





Assessment:


Vital Signs/I&O:





                                   Vital Signs








  Date Time  Temp Pulse Resp B/P (MAP) Pulse Ox O2 Delivery O2 Flow Rate FiO2


 


1/15/22 05:50 97.4 73 16 150/69 (96) 94   


 


1/14/22 16:11      Room Air  














                                    I & O   


 


 1/14/22 1/14/22 1/15/22





 14:59 22:59 06:59


 


Intake Total 600 ml 240 ml 


 


Balance 600 ml 240 ml 











Current Medications:


Meds:





Current Medications








 Medications


  (Trade)  Dose


 Ordered  Sig/Xin


 Route


 PRN Reason  Start Time


 Stop Time Status Last Admin


Dose Admin


 


 Acetaminophen


  (Tylenol)  650 mg  PRN Q6HRS  PRN


 PO


 MILD PAIN / TEMP > 100.3'F  1/3/22 15:15


    1/13/22 15:35





 


 Multi-Ingredient


 Ointment


  (Analgesic Balm)  1 dwayne  PRN QID  PRN


 TP


 MUSCLE PAIN  1/3/22 15:15


     





 


 Al Hydroxide/Mg


 Hydroxide


  (Mylanta Plus Xs)  15 ml  PRN AFTMEALHC  PRN


 PO


 DYSPEPSIA  1/3/22 15:15


     





 


 Magnesium


 Hydroxide


  (Milk Of


 Magnesia)  2,400 mg  PRN QHS  PRN


 PO


 1st choice CONSTIPATION  1/3/22 15:15


     





 


 Diphenoxylate HCl/


 Atropine


  (Lomotil)  1 tab  PRN BID  PRN


 PO


 DIARRHEA  1/3/22 16:15


     





 


 Docusate Sodium


  (Colace)  100 mg  PRN DAILY  PRN


 PO


 2ND CHOICE CONSTIPATION  1/3/22 16:15


     





 


 Donepezil HCl


  (Aricept)  10 mg  QHS


 PO


   1/3/22 21:00


 1/7/22 11:37 DC 1/6/22 21:06





 


 Losartan Potassium


  (Cozaar)  50 mg  DAILY


 PO


   1/4/22 09:00


    1/14/22 07:31





 


 Memantine


  (Namenda)  5 mg  QHS


 PO


   1/3/22 21:00


 1/7/22 11:37 DC 1/6/22 21:06





 


 Simvastatin


  (Zocor)  40 mg  DAILY


 PO


   1/4/22 09:00


 1/8/22 12:13 DC 1/7/22 08:03





 


 Vitamin D


  (Vitamin D3)  5,000 unit  DAILY


 PO


   1/4/22 09:00


    1/14/22 07:31





 


 Glucosamine/


 Chondroitin


  (Glucosamine-Chondroitin


 500/400mg)  1 cap  DAILY


 PO


   1/4/22 09:00


    1/14/22 07:31





 


 Multivitamins/


 Calcium


  (Thera-M Plus)  1 tab  DAILY


 PO


   1/4/22 09:00


    1/14/22 07:32





 


 Acetaminophen/


 Codeine Phosphate


  (Tylenol #3)  1 tab  PRN BID  PRN


 PO


 PAIN  1/3/22 17:00


     





 


 Levofloxacin


  (Levaquin)  250 mg  DAILY06


 PO


   1/6/22 12:15


 1/8/22 12:13 DC 1/7/22 05:23





 


 Lactobacillus


 Rhamnosus


  (Culturelle)  1 cap  BID


 PO


   1/8/22 21:00


    1/14/22 20:35





 


 Simvastatin


  (Zocor)  40 mg  HS


 PO


   1/8/22 21:00


    1/14/22 20:35





 


 Doxycycline


 Hyclate


  (Vibra-Tab)  100 mg  BID


 PO


   1/8/22 21:00


 1/13/22 12:00 DC 1/13/22 08:11





 


 Olanzapine


  (ZyPREXA ZYDIS)  2.5 mg  PRN Q2HR  PRN


 PO


 PSYCHOSIS  1/10/22 12:00


    1/14/22 12:22





 


 Mirtazapine


  (Remeron)  7.5 mg  QHS


 PO


   1/10/22 21:00


    1/14/22 20:34





 


 Trazodone HCl


  (Desyrel)  50 mg  PRN QHS  PRN


 PO


 INSOMNIA  1/10/22 12:00


     





 


 Sertraline HCl


  (Zoloft)  25 mg  QHS


 PO


   1/12/22 21:00


 1/14/22 23:00 DC 1/14/22 20:34





 


 Sertraline HCl


  (Zoloft)  50 mg  QHS


 PO


   1/15/22 21:00


     





 


 Quetiapine


 Fumarate


  (SEROquel)  12.5 mg  1700


 PO


   1/13/22 17:00


    1/14/22 16:54











I have reviewed the current psychotropics carefully including drug interactions.

 Risk benefit ratio favors no change other than as noted in my dictated progress

note.





Diagnosis:


Problems:  


(1) Impulse control disorder, unspecified


(2) Anxiety disorder, unspecified


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 

disturbance


(6) Major neurocognitive disorder











DAWNA BAILEY MD                 Eddie 15, 2022 07:34

## 2022-01-16 VITALS — SYSTOLIC BLOOD PRESSURE: 134 MMHG | DIASTOLIC BLOOD PRESSURE: 80 MMHG

## 2022-01-16 VITALS — SYSTOLIC BLOOD PRESSURE: 137 MMHG | DIASTOLIC BLOOD PRESSURE: 83 MMHG

## 2022-01-16 LAB
ALBUMIN SERPL-MCNC: 3.3 G/DL (ref 3.4–5)
ALBUMIN/GLOB SERPL: 1 {RATIO} (ref 1–1.7)
ALP SERPL-CCNC: 119 U/L (ref 46–116)
ALT SERPL-CCNC: 19 U/L (ref 14–59)
ANION GAP SERPL CALC-SCNC: 10 MMOL/L (ref 6–14)
AST SERPL-CCNC: 25 U/L (ref 15–37)
BASOPHILS # BLD AUTO: 0 X10^3/UL (ref 0–0.2)
BASOPHILS NFR BLD: 0 % (ref 0–3)
BILIRUB SERPL-MCNC: 0.2 MG/DL (ref 0.2–1)
BUN/CREAT SERPL: 37 (ref 6–20)
CA-I SERPL ISE-MCNC: 37 MG/DL (ref 7–20)
CALCIUM SERPL-MCNC: 8.7 MG/DL (ref 8.5–10.1)
CHLORIDE SERPL-SCNC: 106 MMOL/L (ref 98–107)
CO2 SERPL-SCNC: 28 MMOL/L (ref 21–32)
CREAT SERPL-MCNC: 1 MG/DL (ref 0.6–1)
EOSINOPHIL NFR BLD: 0.1 X10^3/UL (ref 0–0.7)
EOSINOPHIL NFR BLD: 1 % (ref 0–3)
ERYTHROCYTE [DISTWIDTH] IN BLOOD BY AUTOMATED COUNT: 12.7 % (ref 11.5–14.5)
GFR SERPLBLD BASED ON 1.73 SQ M-ARVRAT: 53 ML/MIN
GLOBULIN SER-MCNC: 3.3 G/DL (ref 2.2–3.8)
GLUCOSE SERPL-MCNC: 98 MG/DL (ref 70–99)
HCT VFR BLD CALC: 37.3 % (ref 36–47)
HGB BLD-MCNC: 12.2 G/DL (ref 12–15.5)
LYMPHOCYTES # BLD: 1.8 X10^3/UL (ref 1–4.8)
LYMPHOCYTES NFR BLD AUTO: 17 % (ref 24–48)
MCH RBC QN AUTO: 30 PG (ref 25–35)
MCHC RBC AUTO-ENTMCNC: 33 G/DL (ref 31–37)
MCV RBC AUTO: 92 FL (ref 79–100)
MONO #: 0.7 X10^3/UL (ref 0–1.1)
MONOCYTES NFR BLD: 7 % (ref 0–9)
NEUT #: 8.1 X10^3UL (ref 1.8–7.7)
NEUTROPHILS NFR BLD AUTO: 75 % (ref 31–73)
PLATELET # BLD AUTO: 304 X10^3/UL (ref 140–400)
POTASSIUM SERPL-SCNC: 3.5 MMOL/L (ref 3.5–5.1)
PROT SERPL-MCNC: 6.6 G/DL (ref 6.4–8.2)
RBC # BLD AUTO: 4.04 X10^6/UL (ref 3.5–5.4)
SODIUM SERPL-SCNC: 144 MMOL/L (ref 136–145)
WBC # BLD AUTO: 10.8 X10^3/UL (ref 4–11)

## 2022-01-16 RX ADMIN — Medication SCH CAP: at 20:19

## 2022-01-16 RX ADMIN — ACETAMINOPHEN PRN MG: 325 TABLET, FILM COATED ORAL at 11:48

## 2022-01-16 RX ADMIN — SERTRALINE HYDROCHLORIDE SCH MG: 50 TABLET ORAL at 20:19

## 2022-01-16 RX ADMIN — Medication SCH CAP: at 08:04

## 2022-01-16 RX ADMIN — MIRTAZAPINE SCH MG: 7.5 TABLET, FILM COATED ORAL at 20:20

## 2022-01-16 RX ADMIN — LOSARTAN POTASSIUM SCH MG: 50 TABLET, FILM COATED ORAL at 08:05

## 2022-01-16 RX ADMIN — MULTIPLE VITAMINS W/ MINERALS TAB SCH TAB: TAB at 08:05

## 2022-01-16 RX ADMIN — Medication SCH CAP: at 08:05

## 2022-01-16 RX ADMIN — SIMVASTATIN SCH MG: 40 TABLET, FILM COATED ORAL at 20:19

## 2022-01-16 RX ADMIN — VITAMIN D, TAB 1000IU (100/BT) SCH UNIT: 25 TAB at 08:05

## 2022-01-16 RX ADMIN — QUETIAPINE FUMARATE SCH MG: 25 TABLET, FILM COATED ORAL at 17:18

## 2022-01-16 NOTE — NUR
Patient woke up with a dry brief and had minimal urine output in toilet this morning.  
CBC/CMP ordered for this morning and will bladder scan patient to see if she is retaining 
urine.

## 2022-01-16 NOTE — PDOC
Exam


Note:


Kuldip Note:


Please also refer to the separate dictated note~for this date of service 

dictated separately.~Patient seen individually. Discussed the patient with 

Nursing staff reviewed the chart.~Reviewed interim history and current 

functioning. Reviewed vital signs,~Labs/ Radiology~and current medications noted

below. Continue current treatment with the changes noted in the dictated 

addendum note





Assessment:


Vital Signs/I&O:





                                   Vital Signs








  Date Time  Temp Pulse Resp B/P (MAP) Pulse Ox O2 Delivery O2 Flow Rate FiO2


 


1/16/22 15:35 97.2 76 20 134/80 (98) 96 Room Air  














                                    I & O   


 


 1/15/22 1/15/22 1/16/22





 15:00 23:00 07:00


 


Intake Total 120 ml 360 ml 


 


Balance 120 ml 360 ml 








Labs:





                                Laboratory Tests








Test


 1/16/22


10:05


 


White Blood Count


 10.8 x10^3/uL


(4.0-11.0)


 


Red Blood Count


 4.04 x10^6/uL


(3.50-5.40)


 


Hemoglobin


 12.2 g/dL


(12.0-15.5)


 


Hematocrit


 37.3 %


(36.0-47.0)


 


Mean Corpuscular Volume


 92 fL ()





 


Mean Corpuscular Hemoglobin 30 pg (25-35)  


 


Mean Corpuscular Hemoglobin


Concent 33 g/dL


(31-37)


 


Red Cell Distribution Width


 12.7 %


(11.5-14.5)


 


Platelet Count


 304 x10^3/uL


(140-400)


 


Neutrophils (%) (Auto) 75 % (31-73)  H


 


Lymphocytes (%) (Auto) 17 % (24-48)  L


 


Monocytes (%) (Auto) 7 % (0-9)  


 


Eosinophils (%) (Auto) 1 % (0-3)  


 


Basophils (%) (Auto) 0 % (0-3)  


 


Neutrophils # (Auto)


 8.1 x10^3uL


(1.8-7.7)  H


 


Lymphocytes # (Auto)


 1.8 x10^3/uL


(1.0-4.8)


 


Monocytes # (Auto)


 0.7 x10^3/uL


(0.0-1.1)


 


Eosinophils # (Auto)


 0.1 x10^3/uL


(0.0-0.7)


 


Basophils # (Auto)


 0.0 x10^3/uL


(0.0-0.2)


 


Sodium Level


 144 mmol/L


(136-145)


 


Potassium Level


 3.5 mmol/L


(3.5-5.1)


 


Chloride Level


 106 mmol/L


()


 


Carbon Dioxide Level


 28 mmol/L


(21-32)


 


Anion Gap 10 (6-14)  


 


Blood Urea Nitrogen


 37 mg/dL


(7-20)  H


 


Creatinine


 1.0 mg/dL


(0.6-1.0)


 


Estimated GFR


(Cockcroft-Gault) 53.0  





 


BUN/Creatinine Ratio 37 (6-20)  H


 


Glucose Level


 98 mg/dL


(70-99)


 


Calcium Level


 8.7 mg/dL


(8.5-10.1)


 


Total Bilirubin


 0.2 mg/dL


(0.2-1.0)


 


Aspartate Amino Transferase


(AST) 25 U/L (15-37)





 


Alanine Aminotransferase (ALT)


 19 U/L (14-59)





 


Alkaline Phosphatase


 119 U/L


()  H


 


Total Protein


 6.6 g/dL


(6.4-8.2)


 


Albumin


 3.3 g/dL


(3.4-5.0)  L


 


Albumin/Globulin Ratio 1.0 (1.0-1.7)  











Current Medications:


Meds:





Laboratory Tests








Test


 1/16/22


10:05


 


White Blood Count 10.8 x10^3/uL 


 


Red Blood Count 4.04 x10^6/uL 


 


Hemoglobin 12.2 g/dL 


 


Hematocrit 37.3 % 


 


Mean Corpuscular Volume 92 fL 


 


Mean Corpuscular Hemoglobin 30 pg 


 


Mean Corpuscular Hemoglobin


Concent 33 g/dL 





 


Red Cell Distribution Width 12.7 % 


 


Platelet Count 304 x10^3/uL 


 


Neutrophils (%) (Auto) 75 % 


 


Lymphocytes (%) (Auto) 17 % 


 


Monocytes (%) (Auto) 7 % 


 


Eosinophils (%) (Auto) 1 % 


 


Basophils (%) (Auto) 0 % 


 


Neutrophils # (Auto) 8.1 x10^3uL 


 


Lymphocytes # (Auto) 1.8 x10^3/uL 


 


Monocytes # (Auto) 0.7 x10^3/uL 


 


Eosinophils # (Auto) 0.1 x10^3/uL 


 


Basophils # (Auto) 0.0 x10^3/uL 


 


Sodium Level 144 mmol/L 


 


Potassium Level 3.5 mmol/L 


 


Chloride Level 106 mmol/L 


 


Carbon Dioxide Level 28 mmol/L 


 


Anion Gap 10 


 


Blood Urea Nitrogen 37 mg/dL 


 


Creatinine 1.0 mg/dL 


 


Estimated GFR


(Cockcroft-Gault) 53.0 





 


BUN/Creatinine Ratio 37 


 


Glucose Level 98 mg/dL 


 


Calcium Level 8.7 mg/dL 


 


Total Bilirubin 0.2 mg/dL 


 


Aspartate Amino Transf


(AST/SGOT) 25 U/L 





 


Alanine Aminotransferase


(ALT/SGPT) 19 U/L 





 


Alkaline Phosphatase 119 U/L 


 


Total Protein 6.6 g/dL 


 


Albumin 3.3 g/dL 


 


Albumin/Globulin Ratio 1.0 








Current Medications








 Medications


  (Trade)  Dose


 Ordered  Sig/Xin


 Route


 PRN Reason  Start Time


 Stop Time Status Last Admin


Dose Admin


 


 Acetaminophen


  (Tylenol)  650 mg  PRN Q6HRS  PRN


 PO


 MILD PAIN / TEMP > 100.3'F  1/3/22 15:15


    1/16/22 11:48





 


 Multi-Ingredient


 Ointment


  (Analgesic Balm)  1 dwayne  PRN QID  PRN


 TP


 MUSCLE PAIN  1/3/22 15:15


     





 


 Al Hydroxide/Mg


 Hydroxide


  (Mylanta Plus Xs)  15 ml  PRN AFTMEALHC  PRN


 PO


 DYSPEPSIA  1/3/22 15:15


     





 


 Magnesium


 Hydroxide


  (Milk Of


 Magnesia)  2,400 mg  PRN QHS  PRN


 PO


 1st choice CONSTIPATION  1/3/22 15:15


     





 


 Diphenoxylate HCl/


 Atropine


  (Lomotil)  1 tab  PRN BID  PRN


 PO


 DIARRHEA  1/3/22 16:15


     





 


 Docusate Sodium


  (Colace)  100 mg  PRN DAILY  PRN


 PO


 2ND CHOICE CONSTIPATION  1/3/22 16:15


     





 


 Donepezil HCl


  (Aricept)  10 mg  QHS


 PO


   1/3/22 21:00


 1/7/22 11:37 DC 1/6/22 21:06





 


 Losartan Potassium


  (Cozaar)  50 mg  DAILY


 PO


   1/4/22 09:00


    1/16/22 08:05





 


 Memantine


  (Namenda)  5 mg  QHS


 PO


   1/3/22 21:00


 1/7/22 11:37 DC 1/6/22 21:06





 


 Simvastatin


  (Zocor)  40 mg  DAILY


 PO


   1/4/22 09:00


 1/8/22 12:13 DC 1/7/22 08:03





 


 Vitamin D


  (Vitamin D3)  5,000 unit  DAILY


 PO


   1/4/22 09:00


    1/16/22 08:05





 


 Glucosamine/


 Chondroitin


  (Glucosamine-Chondroitin


 500/400mg)  1 cap  DAILY


 PO


   1/4/22 09:00


    1/16/22 08:04





 


 Multivitamins/


 Calcium


  (Thera-M Plus)  1 tab  DAILY


 PO


   1/4/22 09:00


    1/16/22 08:05





 


 Acetaminophen/


 Codeine Phosphate


  (Tylenol #3)  1 tab  PRN BID  PRN


 PO


 PAIN  1/3/22 17:00


     





 


 Levofloxacin


  (Levaquin)  250 mg  DAILY06


 PO


   1/6/22 12:15


 1/8/22 12:13 DC 1/7/22 05:23





 


 Lactobacillus


 Rhamnosus


  (Culturelle)  1 cap  BID


 PO


   1/8/22 21:00


    1/16/22 20:19





 


 Simvastatin


  (Zocor)  40 mg  HS


 PO


   1/8/22 21:00


    1/16/22 20:19





 


 Doxycycline


 Hyclate


  (Vibra-Tab)  100 mg  BID


 PO


   1/8/22 21:00


 1/13/22 12:00 DC 1/13/22 08:11





 


 Olanzapine


  (ZyPREXA ZYDIS)  2.5 mg  PRN Q2HR  PRN


 PO


 PSYCHOSIS  1/10/22 12:00


    1/14/22 12:22





 


 Mirtazapine


  (Remeron)  7.5 mg  QHS


 PO


   1/10/22 21:00


    1/16/22 20:20





 


 Trazodone HCl


  (Desyrel)  50 mg  PRN QHS  PRN


 PO


 INSOMNIA  1/10/22 12:00


     





 


 Sertraline HCl


  (Zoloft)  25 mg  QHS


 PO


   1/12/22 21:00


 1/14/22 23:00 DC 1/14/22 20:34





 


 Sertraline HCl


  (Zoloft)  50 mg  QHS


 PO


   1/15/22 21:00


    1/16/22 20:19





 


 Quetiapine


 Fumarate


  (SEROquel)  12.5 mg  1700


 PO


   1/13/22 17:00


    1/16/22 17:18











I have reviewed the current psychotropics carefully including drug interactions.

 Risk benefit ratio favors no change other than as noted in my dictated progress

note.





Diagnosis:


Problems:  


(1) Impulse control disorder, unspecified


(2) Anxiety disorder, unspecified


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 

disturbance


(6) Major neurocognitive disorder











DAWNA BAILEY MD                 Jan 16, 2022 21:37

## 2022-01-16 NOTE — NUR
Nurse Day Shift Note:



Pt presents in a disorganized and confused manner.  Pt is medication compliant.  Pt is noted 
to spend time resting in her room today.  Pt continues to be encouraged to eat.  Pt has 
voided in the toilet today.  Pt had a phone call from her  today.  Pt does not 
verbalize very much when her  speaks to her on the phone.  Pts vitals are WNL.  Pt 
slept 8.5 hours last night.  Will continue to monitor.

## 2022-01-16 NOTE — NUR
Patient is awake in bed on assumption of care. She is very disorganized, confused. Answers 
to her name but cannot state her last name or .  She is compliant with physical 
assessments but is unable to answer most other assessment questions. Compliant with 
medications crushed in pudding. Patient appears to be sleeping comfortably at present time. 
Will continue to monitor.

## 2022-01-17 VITALS — SYSTOLIC BLOOD PRESSURE: 153 MMHG | DIASTOLIC BLOOD PRESSURE: 89 MMHG

## 2022-01-17 VITALS — DIASTOLIC BLOOD PRESSURE: 68 MMHG | SYSTOLIC BLOOD PRESSURE: 105 MMHG

## 2022-01-17 RX ADMIN — QUETIAPINE FUMARATE SCH MG: 25 TABLET, FILM COATED ORAL at 17:24

## 2022-01-17 RX ADMIN — SERTRALINE HYDROCHLORIDE SCH MG: 50 TABLET ORAL at 20:37

## 2022-01-17 RX ADMIN — VITAMIN D, TAB 1000IU (100/BT) SCH UNIT: 25 TAB at 08:36

## 2022-01-17 RX ADMIN — MIRTAZAPINE SCH MG: 7.5 TABLET, FILM COATED ORAL at 20:36

## 2022-01-17 RX ADMIN — MULTIPLE VITAMINS W/ MINERALS TAB SCH TAB: TAB at 08:37

## 2022-01-17 RX ADMIN — Medication SCH CAP: at 20:36

## 2022-01-17 RX ADMIN — Medication SCH CAP: at 08:36

## 2022-01-17 RX ADMIN — LOSARTAN POTASSIUM SCH MG: 50 TABLET, FILM COATED ORAL at 08:37

## 2022-01-17 RX ADMIN — SIMVASTATIN SCH MG: 40 TABLET, FILM COATED ORAL at 20:37

## 2022-01-17 RX ADMIN — TRAZODONE HYDROCHLORIDE PRN MG: 50 TABLET, FILM COATED ORAL at 20:37

## 2022-01-17 NOTE — PDOC
Exam


Note:


Kuldip Note:


This note is a late entry for 01/16/2022 covers elements not covered in my 

initial note.





Subjective:  The patient was seen on telehealth rounds on 01/16/2022 with 

Shima VINSON, discussed and reviewed the chart.  The patient slept 8-1/2 hours 

previous night.  She remains on finger foods, remains confused, less irritable. 

 has been calling for regular updates on her insistent that she would be 

discharged on 01/20 and that is the plan for now despite her quarantine.





Review of Systems:  Ambulation impaired.  No CV, , pulmonary, eye, ENT system 

symptoms on review.  Reliability poor.





Mental Status Exam:  The patient is oriented to herself.  Insight and judgment, 

recent and remote memory, attention and concentration, fund of knowledge is poor

consistent with her diagnoses.





Laboratory Data:  Reviewed.





Impression:  Major neurocognitive disorder, Alzheimer, vascular with delusion, 

depression behavioral disturbance.  Anxiety disorder unspecified.  Impulse 

control disorder unspecified.





Plan:  No change from initial note.





Assessment:


Vital Signs/I&O:





                                   Vital Signs








  Date Time  Temp Pulse Resp B/P (MAP) Pulse Ox O2 Delivery O2 Flow Rate FiO2


 


1/17/22 06:10 97.9 75 18 153/89 (110) 97   


 


1/16/22 15:35      Room Air  














                                    I & O   


 


 1/16/22 1/16/22 1/17/22





 14:59 22:59 06:59


 


Intake Total 1200 ml 340 ml 


 


Balance 1200 ml 340 ml 








Labs:





                                Laboratory Tests








Test


 1/16/22


10:05


 


White Blood Count


 10.8 x10^3/uL


(4.0-11.0)


 


Red Blood Count


 4.04 x10^6/uL


(3.50-5.40)


 


Hemoglobin


 12.2 g/dL


(12.0-15.5)


 


Hematocrit


 37.3 %


(36.0-47.0)


 


Mean Corpuscular Volume


 92 fL ()





 


Mean Corpuscular Hemoglobin 30 pg (25-35)  


 


Mean Corpuscular Hemoglobin


Concent 33 g/dL


(31-37)


 


Red Cell Distribution Width


 12.7 %


(11.5-14.5)


 


Platelet Count


 304 x10^3/uL


(140-400)


 


Neutrophils (%) (Auto) 75 % (31-73)  H


 


Lymphocytes (%) (Auto) 17 % (24-48)  L


 


Monocytes (%) (Auto) 7 % (0-9)  


 


Eosinophils (%) (Auto) 1 % (0-3)  


 


Basophils (%) (Auto) 0 % (0-3)  


 


Neutrophils # (Auto)


 8.1 x10^3uL


(1.8-7.7)  H


 


Lymphocytes # (Auto)


 1.8 x10^3/uL


(1.0-4.8)


 


Monocytes # (Auto)


 0.7 x10^3/uL


(0.0-1.1)


 


Eosinophils # (Auto)


 0.1 x10^3/uL


(0.0-0.7)


 


Basophils # (Auto)


 0.0 x10^3/uL


(0.0-0.2)


 


Sodium Level


 144 mmol/L


(136-145)


 


Potassium Level


 3.5 mmol/L


(3.5-5.1)


 


Chloride Level


 106 mmol/L


()


 


Carbon Dioxide Level


 28 mmol/L


(21-32)


 


Anion Gap 10 (6-14)  


 


Blood Urea Nitrogen


 37 mg/dL


(7-20)  H


 


Creatinine


 1.0 mg/dL


(0.6-1.0)


 


Estimated GFR


(Cockcroft-Gault) 53.0  





 


BUN/Creatinine Ratio 37 (6-20)  H


 


Glucose Level


 98 mg/dL


(70-99)


 


Calcium Level


 8.7 mg/dL


(8.5-10.1)


 


Total Bilirubin


 0.2 mg/dL


(0.2-1.0)


 


Aspartate Amino Transferase


(AST) 25 U/L (15-37)





 


Alanine Aminotransferase (ALT)


 19 U/L (14-59)





 


Alkaline Phosphatase


 119 U/L


()  H


 


Total Protein


 6.6 g/dL


(6.4-8.2)


 


Albumin


 3.3 g/dL


(3.4-5.0)  L


 


Albumin/Globulin Ratio 1.0 (1.0-1.7)  











Current Medications:


Meds:





Laboratory Tests








Test


 1/16/22


10:05


 


White Blood Count 10.8 x10^3/uL 


 


Red Blood Count 4.04 x10^6/uL 


 


Hemoglobin 12.2 g/dL 


 


Hematocrit 37.3 % 


 


Mean Corpuscular Volume 92 fL 


 


Mean Corpuscular Hemoglobin 30 pg 


 


Mean Corpuscular Hemoglobin


Concent 33 g/dL 





 


Red Cell Distribution Width 12.7 % 


 


Platelet Count 304 x10^3/uL 


 


Neutrophils (%) (Auto) 75 % 


 


Lymphocytes (%) (Auto) 17 % 


 


Monocytes (%) (Auto) 7 % 


 


Eosinophils (%) (Auto) 1 % 


 


Basophils (%) (Auto) 0 % 


 


Neutrophils # (Auto) 8.1 x10^3uL 


 


Lymphocytes # (Auto) 1.8 x10^3/uL 


 


Monocytes # (Auto) 0.7 x10^3/uL 


 


Eosinophils # (Auto) 0.1 x10^3/uL 


 


Basophils # (Auto) 0.0 x10^3/uL 


 


Sodium Level 144 mmol/L 


 


Potassium Level 3.5 mmol/L 


 


Chloride Level 106 mmol/L 


 


Carbon Dioxide Level 28 mmol/L 


 


Anion Gap 10 


 


Blood Urea Nitrogen 37 mg/dL 


 


Creatinine 1.0 mg/dL 


 


Estimated GFR


(Cockcroft-Gault) 53.0 





 


BUN/Creatinine Ratio 37 


 


Glucose Level 98 mg/dL 


 


Calcium Level 8.7 mg/dL 


 


Total Bilirubin 0.2 mg/dL 


 


Aspartate Amino Transf


(AST/SGOT) 25 U/L 





 


Alanine Aminotransferase


(ALT/SGPT) 19 U/L 





 


Alkaline Phosphatase 119 U/L 


 


Total Protein 6.6 g/dL 


 


Albumin 3.3 g/dL 


 


Albumin/Globulin Ratio 1.0 








Current Medications








 Medications


  (Trade)  Dose


 Ordered  Sig/Xin


 Route


 PRN Reason  Start Time


 Stop Time Status Last Admin


Dose Admin


 


 Acetaminophen


  (Tylenol)  650 mg  PRN Q6HRS  PRN


 PO


 MILD PAIN / TEMP > 100.3'F  1/3/22 15:15


    1/16/22 11:48





 


 Multi-Ingredient


 Ointment


  (Analgesic Balm)  1 dwayne  PRN QID  PRN


 TP


 MUSCLE PAIN  1/3/22 15:15


     





 


 Al Hydroxide/Mg


 Hydroxide


  (Mylanta Plus Xs)  15 ml  PRN AFTMEALHC  PRN


 PO


 DYSPEPSIA  1/3/22 15:15


     





 


 Magnesium


 Hydroxide


  (Milk Of


 Magnesia)  2,400 mg  PRN QHS  PRN


 PO


 1st choice CONSTIPATION  1/3/22 15:15


     





 


 Diphenoxylate HCl/


 Atropine


  (Lomotil)  1 tab  PRN BID  PRN


 PO


 DIARRHEA  1/3/22 16:15


     





 


 Docusate Sodium


  (Colace)  100 mg  PRN DAILY  PRN


 PO


 2ND CHOICE CONSTIPATION  1/3/22 16:15


     





 


 Donepezil HCl


  (Aricept)  10 mg  QHS


 PO


   1/3/22 21:00


 1/7/22 11:37 DC 1/6/22 21:06





 


 Losartan Potassium


  (Cozaar)  50 mg  DAILY


 PO


   1/4/22 09:00


    1/16/22 08:05





 


 Memantine


  (Namenda)  5 mg  QHS


 PO


   1/3/22 21:00


 1/7/22 11:37 DC 1/6/22 21:06





 


 Simvastatin


  (Zocor)  40 mg  DAILY


 PO


   1/4/22 09:00


 1/8/22 12:13 DC 1/7/22 08:03





 


 Vitamin D


  (Vitamin D3)  5,000 unit  DAILY


 PO


   1/4/22 09:00


    1/16/22 08:05





 


 Glucosamine/


 Chondroitin


  (Glucosamine-Chondroitin


 500/400mg)  1 cap  DAILY


 PO


   1/4/22 09:00


    1/16/22 08:04





 


 Multivitamins/


 Calcium


  (Thera-M Plus)  1 tab  DAILY


 PO


   1/4/22 09:00


    1/16/22 08:05





 


 Acetaminophen/


 Codeine Phosphate


  (Tylenol #3)  1 tab  PRN BID  PRN


 PO


 PAIN  1/3/22 17:00


     





 


 Levofloxacin


  (Levaquin)  250 mg  DAILY06


 PO


   1/6/22 12:15


 1/8/22 12:13 DC 1/7/22 05:23





 


 Lactobacillus


 Rhamnosus


  (Culturelle)  1 cap  BID


 PO


   1/8/22 21:00


    1/16/22 20:19





 


 Simvastatin


  (Zocor)  40 mg  HS


 PO


   1/8/22 21:00


    1/16/22 20:19





 


 Doxycycline


 Hyclate


  (Vibra-Tab)  100 mg  BID


 PO


   1/8/22 21:00


 1/13/22 12:00 DC 1/13/22 08:11





 


 Olanzapine


  (ZyPREXA ZYDIS)  2.5 mg  PRN Q2HR  PRN


 PO


 PSYCHOSIS  1/10/22 12:00


    1/14/22 12:22





 


 Mirtazapine


  (Remeron)  7.5 mg  QHS


 PO


   1/10/22 21:00


    1/16/22 20:20





 


 Trazodone HCl


  (Desyrel)  50 mg  PRN QHS  PRN


 PO


 INSOMNIA  1/10/22 12:00


     





 


 Sertraline HCl


  (Zoloft)  25 mg  QHS


 PO


   1/12/22 21:00


 1/14/22 23:00 DC 1/14/22 20:34





 


 Sertraline HCl


  (Zoloft)  50 mg  QHS


 PO


   1/15/22 21:00


    1/16/22 20:19





 


 Quetiapine


 Fumarate


  (SEROquel)  12.5 mg  1700


 PO


   1/13/22 17:00


    1/16/22 17:18











I have reviewed the current psychotropics carefully including drug interactions.

 Risk benefit ratio favors no change other than as noted in my dictated progress

note.





Diagnosis:


Problems:  


(1) Impulse control disorder, unspecified


(2) Anxiety disorder, unspecified


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 

disturbance


(6) Major neurocognitive disorder











DAWNA BAILEY MD                 Jan 17, 2022 07:40

## 2022-01-17 NOTE — PDOC
Exam


Note:


Kuldip Note:


This note is a late entry for 01/15/2022 covers elements not covered in my 

initial note.





Subjective:  The patient was seen on telehealth rounds on 01/15/2022 with 

Elvia VINSON, discussed and reviewed the chart.  The patient slept 6 hours 

previous night.  She is resistive to showers and toilet and she remains conf

used.   calls on a regular basis.  She is irritable at times.  The 

patient is on finger foods and does better with this.





Review of Systems:  Ambulation impaired, with walker.  No CV, , pulmonary, 

eye, ENT system symptoms on review.  Reliability poor.





Mental Status Exam:  The patient is oriented to herself.  Insight and judgment, 

recent and remote memory, attention and concentration, fund of knowledge is poor

consistent with her diagnoses.





Laboratory Data:  Reviewed.





Impression:  Major neurocognitive disorder, Alzheimer, vascular with delusion, 

depression behavioral disturbance.  Anxiety disorder unspecified.  Impulse co

ntrol disorder unspecified.





Plan:  No change from initial note.





Assessment:


Vital Signs/I&O:





                                   Vital Signs








  Date Time  Temp Pulse Resp B/P (MAP) Pulse Ox O2 Delivery O2 Flow Rate FiO2


 


1/17/22 06:10 97.9 75 18 153/89 (110) 97   


 


1/16/22 15:35      Room Air  














                                    I & O   


 


 1/16/22 1/16/22 1/17/22





 14:59 22:59 06:59


 


Intake Total 1200 ml 340 ml 


 


Balance 1200 ml 340 ml 








Labs:





                                Laboratory Tests








Test


 1/16/22


10:05


 


White Blood Count


 10.8 x10^3/uL


(4.0-11.0)


 


Red Blood Count


 4.04 x10^6/uL


(3.50-5.40)


 


Hemoglobin


 12.2 g/dL


(12.0-15.5)


 


Hematocrit


 37.3 %


(36.0-47.0)


 


Mean Corpuscular Volume


 92 fL ()





 


Mean Corpuscular Hemoglobin 30 pg (25-35)  


 


Mean Corpuscular Hemoglobin


Concent 33 g/dL


(31-37)


 


Red Cell Distribution Width


 12.7 %


(11.5-14.5)


 


Platelet Count


 304 x10^3/uL


(140-400)


 


Neutrophils (%) (Auto) 75 % (31-73)  H


 


Lymphocytes (%) (Auto) 17 % (24-48)  L


 


Monocytes (%) (Auto) 7 % (0-9)  


 


Eosinophils (%) (Auto) 1 % (0-3)  


 


Basophils (%) (Auto) 0 % (0-3)  


 


Neutrophils # (Auto)


 8.1 x10^3uL


(1.8-7.7)  H


 


Lymphocytes # (Auto)


 1.8 x10^3/uL


(1.0-4.8)


 


Monocytes # (Auto)


 0.7 x10^3/uL


(0.0-1.1)


 


Eosinophils # (Auto)


 0.1 x10^3/uL


(0.0-0.7)


 


Basophils # (Auto)


 0.0 x10^3/uL


(0.0-0.2)


 


Sodium Level


 144 mmol/L


(136-145)


 


Potassium Level


 3.5 mmol/L


(3.5-5.1)


 


Chloride Level


 106 mmol/L


()


 


Carbon Dioxide Level


 28 mmol/L


(21-32)


 


Anion Gap 10 (6-14)  


 


Blood Urea Nitrogen


 37 mg/dL


(7-20)  H


 


Creatinine


 1.0 mg/dL


(0.6-1.0)


 


Estimated GFR


(Cockcroft-Gault) 53.0  





 


BUN/Creatinine Ratio 37 (6-20)  H


 


Glucose Level


 98 mg/dL


(70-99)


 


Calcium Level


 8.7 mg/dL


(8.5-10.1)


 


Total Bilirubin


 0.2 mg/dL


(0.2-1.0)


 


Aspartate Amino Transferase


(AST) 25 U/L (15-37)





 


Alanine Aminotransferase (ALT)


 19 U/L (14-59)





 


Alkaline Phosphatase


 119 U/L


()  H


 


Total Protein


 6.6 g/dL


(6.4-8.2)


 


Albumin


 3.3 g/dL


(3.4-5.0)  L


 


Albumin/Globulin Ratio 1.0 (1.0-1.7)  











Current Medications:


Meds:





Laboratory Tests








Test


 1/16/22


10:05


 


White Blood Count 10.8 x10^3/uL 


 


Red Blood Count 4.04 x10^6/uL 


 


Hemoglobin 12.2 g/dL 


 


Hematocrit 37.3 % 


 


Mean Corpuscular Volume 92 fL 


 


Mean Corpuscular Hemoglobin 30 pg 


 


Mean Corpuscular Hemoglobin


Concent 33 g/dL 





 


Red Cell Distribution Width 12.7 % 


 


Platelet Count 304 x10^3/uL 


 


Neutrophils (%) (Auto) 75 % 


 


Lymphocytes (%) (Auto) 17 % 


 


Monocytes (%) (Auto) 7 % 


 


Eosinophils (%) (Auto) 1 % 


 


Basophils (%) (Auto) 0 % 


 


Neutrophils # (Auto) 8.1 x10^3uL 


 


Lymphocytes # (Auto) 1.8 x10^3/uL 


 


Monocytes # (Auto) 0.7 x10^3/uL 


 


Eosinophils # (Auto) 0.1 x10^3/uL 


 


Basophils # (Auto) 0.0 x10^3/uL 


 


Sodium Level 144 mmol/L 


 


Potassium Level 3.5 mmol/L 


 


Chloride Level 106 mmol/L 


 


Carbon Dioxide Level 28 mmol/L 


 


Anion Gap 10 


 


Blood Urea Nitrogen 37 mg/dL 


 


Creatinine 1.0 mg/dL 


 


Estimated GFR


(Cockcroft-Gault) 53.0 





 


BUN/Creatinine Ratio 37 


 


Glucose Level 98 mg/dL 


 


Calcium Level 8.7 mg/dL 


 


Total Bilirubin 0.2 mg/dL 


 


Aspartate Amino Transf


(AST/SGOT) 25 U/L 





 


Alanine Aminotransferase


(ALT/SGPT) 19 U/L 





 


Alkaline Phosphatase 119 U/L 


 


Total Protein 6.6 g/dL 


 


Albumin 3.3 g/dL 


 


Albumin/Globulin Ratio 1.0 








Current Medications








 Medications


  (Trade)  Dose


 Ordered  Sig/Xin


 Route


 PRN Reason  Start Time


 Stop Time Status Last Admin


Dose Admin


 


 Acetaminophen


  (Tylenol)  650 mg  PRN Q6HRS  PRN


 PO


 MILD PAIN / TEMP > 100.3'F  1/3/22 15:15


    1/16/22 11:48





 


 Multi-Ingredient


 Ointment


  (Analgesic Balm)  1 dwayne  PRN QID  PRN


 TP


 MUSCLE PAIN  1/3/22 15:15


     





 


 Al Hydroxide/Mg


 Hydroxide


  (Mylanta Plus Xs)  15 ml  PRN AFTMEALHC  PRN


 PO


 DYSPEPSIA  1/3/22 15:15


     





 


 Magnesium


 Hydroxide


  (Milk Of


 Magnesia)  2,400 mg  PRN QHS  PRN


 PO


 1[employer] choice CONSTIPATION  1/3/22 15:15


     





 


 Diphenoxylate HCl/


 Atropine


  (Lomotil)  1 tab  PRN BID  PRN


 PO


 DIARRHEA  1/3/22 16:15


     





 


 Docusate Sodium


  (Colace)  100 mg  PRN DAILY  PRN


 PO


 2ND CHOICE CONSTIPATION  1/3/22 16:15


     





 


 Donepezil HCl


  (Aricept)  10 mg  QHS


 PO


   1/3/22 21:00


 1/7/22 11:37 DC 1/6/22 21:06





 


 Losartan Potassium


  (Cozaar)  50 mg  DAILY


 PO


   1/4/22 09:00


    1/16/22 08:05





 


 Memantine


  (Namenda)  5 mg  QHS


 PO


   1/3/22 21:00


 1/7/22 11:37 DC 1/6/22 21:06





 


 Simvastatin


  (Zocor)  40 mg  DAILY


 PO


   1/4/22 09:00


 1/8/22 12:13 DC 1/7/22 08:03





 


 Vitamin D


  (Vitamin D3)  5,000 unit  DAILY


 PO


   1/4/22 09:00


    1/16/22 08:05





 


 Glucosamine/


 Chondroitin


  (Glucosamine-Chondroitin


 500/400mg)  1 cap  DAILY


 PO


   1/4/22 09:00


    1/16/22 08:04





 


 Multivitamins/


 Calcium


  (Thera-M Plus)  1 tab  DAILY


 PO


   1/4/22 09:00


    1/16/22 08:05





 


 Acetaminophen/


 Codeine Phosphate


  (Tylenol #3)  1 tab  PRN BID  PRN


 PO


 PAIN  1/3/22 17:00


     





 


 Levofloxacin


  (Levaquin)  250 mg  DAILY06


 PO


   1/6/22 12:15


 1/8/22 12:13 DC 1/7/22 05:23





 


 Lactobacillus


 Rhamnosus


  (Culturelle)  1 cap  BID


 PO


   1/8/22 21:00


    1/16/22 20:19





 


 Simvastatin


  (Zocor)  40 mg  HS


 PO


   1/8/22 21:00


    1/16/22 20:19





 


 Doxycycline


 Hyclate


  (Vibra-Tab)  100 mg  BID


 PO


   1/8/22 21:00


 1/13/22 12:00 DC 1/13/22 08:11





 


 Olanzapine


  (ZyPREXA ZYDIS)  2.5 mg  PRN Q2HR  PRN


 PO


 PSYCHOSIS  1/10/22 12:00


    1/14/22 12:22





 


 Mirtazapine


  (Remeron)  7.5 mg  QHS


 PO


   1/10/22 21:00


    1/16/22 20:20





 


 Trazodone HCl


  (Desyrel)  50 mg  PRN QHS  PRN


 PO


 INSOMNIA  1/10/22 12:00


     





 


 Sertraline HCl


  (Zoloft)  25 mg  QHS


 PO


   1/12/22 21:00


 1/14/22 23:00 DC 1/14/22 20:34





 


 Sertraline HCl


  (Zoloft)  50 mg  QHS


 PO


   1/15/22 21:00


    1/16/22 20:19





 


 Quetiapine


 Fumarate


  (SEROquel)  12.5 mg  1700


 PO


   1/13/22 17:00


    1/16/22 17:18











I have reviewed the current psychotropics carefully including drug interactions.

 Risk benefit ratio favors no change other than as noted in my dictated progress

note.





Diagnosis:


Problems:  


(1) Impulse control disorder, unspecified


(2) Anxiety disorder, unspecified


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 

disturbance


(6) Major neurocognitive disorder











DAWNA BAILEY MD                 Jan 17, 2022 07:18

## 2022-01-17 NOTE — PDOC
Exam


Note:


Kuldip Note:


Please also refer to the separate dictated note~for this date of service 

dictated separately.~Patient seen individually. Discussed the patient with 

Nursing staff reviewed the chart.~Reviewed interim history and current 

functioning. Reviewed vital signs,~Labs/ Radiology~and current medications noted

below. Continue current treatment with the changes noted in the dictated 

addendum note





Assessment:


Vital Signs/I&O:





                                   Vital Signs








  Date Time  Temp Pulse Resp B/P (MAP) Pulse Ox O2 Delivery O2 Flow Rate FiO2


 


1/17/22 15:49 98.9 81 18 105/68 (80) 96 Room Air  














                                    I & O   


 


 1/16/22 1/16/22 1/17/22





 15:00 23:00 07:00


 


Intake Total 1200 ml 340 ml 


 


Balance 1200 ml 340 ml 











Current Medications:


Meds:





Current Medications








 Medications


  (Trade)  Dose


 Ordered  Sig/Xin


 Route


 PRN Reason  Start Time


 Stop Time Status Last Admin


Dose Admin


 


 Acetaminophen


  (Tylenol)  650 mg  PRN Q6HRS  PRN


 PO


 MILD PAIN / TEMP > 100.3'F  1/3/22 15:15


    1/16/22 11:48





 


 Multi-Ingredient


 Ointment


  (Analgesic Balm)  1 dwayne  PRN QID  PRN


 TP


 MUSCLE PAIN  1/3/22 15:15


     





 


 Al Hydroxide/Mg


 Hydroxide


  (Mylanta Plus Xs)  15 ml  PRN AFTMEALHC  PRN


 PO


 DYSPEPSIA  1/3/22 15:15


     





 


 Magnesium


 Hydroxide


  (Milk Of


 Magnesia)  2,400 mg  PRN QHS  PRN


 PO


 1st choice CONSTIPATION  1/3/22 15:15


     





 


 Diphenoxylate HCl/


 Atropine


  (Lomotil)  1 tab  PRN BID  PRN


 PO


 DIARRHEA  1/3/22 16:15


     





 


 Docusate Sodium


  (Colace)  100 mg  PRN DAILY  PRN


 PO


 2ND CHOICE CONSTIPATION  1/3/22 16:15


     





 


 Donepezil HCl


  (Aricept)  10 mg  QHS


 PO


   1/3/22 21:00


 1/7/22 11:37 DC 1/6/22 21:06





 


 Losartan Potassium


  (Cozaar)  50 mg  DAILY


 PO


   1/4/22 09:00


    1/17/22 08:37





 


 Memantine


  (Namenda)  5 mg  QHS


 PO


   1/3/22 21:00


 1/7/22 11:37 DC 1/6/22 21:06





 


 Simvastatin


  (Zocor)  40 mg  DAILY


 PO


   1/4/22 09:00


 1/8/22 12:13 DC 1/7/22 08:03





 


 Vitamin D


  (Vitamin D3)  5,000 unit  DAILY


 PO


   1/4/22 09:00


    1/17/22 08:36





 


 Glucosamine/


 Chondroitin


  (Glucosamine-Chondroitin


 500/400mg)  1 cap  DAILY


 PO


   1/4/22 09:00


    1/17/22 08:36





 


 Multivitamins/


 Calcium


  (Thera-M Plus)  1 tab  DAILY


 PO


   1/4/22 09:00


    1/17/22 08:37





 


 Acetaminophen/


 Codeine Phosphate


  (Tylenol #3)  1 tab  PRN BID  PRN


 PO


 PAIN  1/3/22 17:00


     





 


 Levofloxacin


  (Levaquin)  250 mg  DAILY06


 PO


   1/6/22 12:15


 1/8/22 12:13 DC 1/7/22 05:23





 


 Lactobacillus


 Rhamnosus


  (Culturelle)  1 cap  BID


 PO


   1/8/22 21:00


    1/17/22 20:36





 


 Simvastatin


  (Zocor)  40 mg  HS


 PO


   1/8/22 21:00


    1/17/22 20:37





 


 Doxycycline


 Hyclate


  (Vibra-Tab)  100 mg  BID


 PO


   1/8/22 21:00


 1/13/22 12:00 DC 1/13/22 08:11





 


 Olanzapine


  (ZyPREXA ZYDIS)  2.5 mg  PRN Q2HR  PRN


 PO


 PSYCHOSIS  1/10/22 12:00


    1/17/22 20:37





 


 Mirtazapine


  (Remeron)  7.5 mg  QHS


 PO


   1/10/22 21:00


    1/17/22 20:36





 


 Trazodone HCl


  (Desyrel)  50 mg  PRN QHS  PRN


 PO


 INSOMNIA  1/10/22 12:00


    1/17/22 20:37





 


 Sertraline HCl


  (Zoloft)  25 mg  QHS


 PO


   1/12/22 21:00


 1/14/22 23:00 DC 1/14/22 20:34





 


 Sertraline HCl


  (Zoloft)  50 mg  QHS


 PO


   1/15/22 21:00


 1/17/22 17:38 DC 1/16/22 20:19





 


 Quetiapine


 Fumarate


  (SEROquel)  12.5 mg  1700


 PO


   1/13/22 17:00


    1/17/22 17:24





 


 Sertraline HCl


  (Zoloft)  75 mg  QHS


 PO


   1/17/22 21:00


    1/17/22 20:37











Current Medications








 Medications


  (Trade)  Dose


 Ordered  Sig/Xin


 Route


 PRN Reason  Start Time


 Stop Time Status Last Admin


Dose Admin


 


 Sertraline HCl


  (Zoloft)  75 mg  QHS


 PO


   1/17/22 21:00


    1/17/22 20:37











I have reviewed the current psychotropics carefully including drug interactions.

 Risk benefit ratio favors no change other than as noted in my dictated progress

note.





Diagnosis:


Problems:  


(1) Impulse control disorder, unspecified


(2) Anxiety disorder, unspecified


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 

disturbance


(6) Major neurocognitive disorder











DAWNA BAILEY MD                 Jan 17, 2022 21:41

## 2022-01-17 NOTE — NUR
Nursing note:



Patient in room for assessment and medications. She is compliant with medications crushed in 
pudding. She is A/o to first name only, unable to voice last name or date of birth. She is 
resistive to cares and hits staff. She was incontinent of stool this afternoon. Patient 
denies pain/discomfort at this time. She walks unsteady independently, staff encourages her 
to use walker without success. She is currently laying awake in bed talking to herself. Will 
continue to monitor.  

-------------------------------------------------------------------------------

Addendum: 01/17/22 at 1836 by DANIEL HAM RN RN

-------------------------------------------------------------------------------

This nurse bladder scanned patient for a total of 219ml. Will continue to monitor.

## 2022-01-17 NOTE — NUR
Nursing Note

Pt in shower shortly after shift change. Highly combative, confused, disoriented, angry 
trying to hit kick and head butt staff.  Mocks nurses speech pattern in word salad.  Has no 
understanding of clothing or shoes can't understand how to put her foot into the shoe or 
pull her arm through the sleeve.  Gets angry with any assistance with these things.  Hits 
grabs and punches out at staff.  Speaks entirely in a word salad, is difficult to assess, 
gets agitated with stethoscope etc.  Won't take a deep breath and cannot answer questions at 
all.  When asked questions she gives the big eye roll as if I'm asking something huge of 
her.

## 2022-01-18 VITALS — SYSTOLIC BLOOD PRESSURE: 109 MMHG | DIASTOLIC BLOOD PRESSURE: 68 MMHG

## 2022-01-18 VITALS — SYSTOLIC BLOOD PRESSURE: 142 MMHG | DIASTOLIC BLOOD PRESSURE: 82 MMHG

## 2022-01-18 RX ADMIN — VITAMIN D, TAB 1000IU (100/BT) SCH UNIT: 25 TAB at 09:00

## 2022-01-18 RX ADMIN — TRAZODONE HYDROCHLORIDE PRN MG: 50 TABLET, FILM COATED ORAL at 20:01

## 2022-01-18 RX ADMIN — Medication SCH CAP: at 09:00

## 2022-01-18 RX ADMIN — Medication SCH CAP: at 20:00

## 2022-01-18 RX ADMIN — SIMVASTATIN SCH MG: 40 TABLET, FILM COATED ORAL at 20:00

## 2022-01-18 RX ADMIN — MIRTAZAPINE SCH MG: 7.5 TABLET, FILM COATED ORAL at 20:00

## 2022-01-18 RX ADMIN — QUETIAPINE FUMARATE SCH MG: 25 TABLET, FILM COATED ORAL at 16:24

## 2022-01-18 RX ADMIN — SERTRALINE HYDROCHLORIDE SCH MG: 50 TABLET ORAL at 20:00

## 2022-01-18 RX ADMIN — MULTIPLE VITAMINS W/ MINERALS TAB SCH TAB: TAB at 09:00

## 2022-01-18 RX ADMIN — LOSARTAN POTASSIUM SCH MG: 50 TABLET, FILM COATED ORAL at 09:00

## 2022-01-18 NOTE — NUR
Nurse Day Shift Note:



Pt presents with disorganized mood/affect.  Pt is not able to voice intelligible dialogue.  
Pt is generally confused.  Pt did not have morning medications, as she was sleeping, after 
having slept for zero hours last night.  Pt continues to need assistance and encouragement 
with eating.  Will continue to monitor.

## 2022-01-18 NOTE — NUR
Pt located in her room this evening sleeping in bed. Cooperative when woken up to administer 
HS medications. Pt currently sleeping.

## 2022-01-18 NOTE — NUR
When pt was being assisted by staff with drinking her gatorade, pt was being resistive and 
combative.  Pt was continued to be encouraged to drink by staff and pt drank fluids.

## 2022-01-18 NOTE — PDOC
Exam


Note:


Kuldip Note:


Please also refer to the separate dictated note~for this date of service 

dictated separately.~Patient seen individually. Discussed the patient with 

Nursing staff reviewed the chart.~Reviewed interim history and current 

functioning. Reviewed vital signs,~Labs/ Radiology~and current medications noted

below. Continue current treatment with the changes noted in the dictated 

addendum note





Assessment:


Vital Signs/I&O:





                                   Vital Signs








  Date Time  Temp Pulse Resp B/P (MAP) Pulse Ox O2 Delivery O2 Flow Rate FiO2


 


1/18/22 16:23 97.8 63 16 109/68 (82) 95   


 


1/18/22 06:18      Room Air  














                                    I & O   


 


 1/17/22 1/17/22 1/18/22





 15:00 23:00 07:00


 


Intake Total 600 ml 360 ml 


 


Balance 600 ml 360 ml 








Labs:





                                Laboratory Tests








Test


 1/18/22


06:30


 


SARS-CoV-2 (PCR)


 Not detected


(NOT DETECTD)











Current Medications:


Meds:





Laboratory Tests








Test


 1/18/22


06:30


 


Coronavirus (COVID-19)(PCR) Not detected 








Current Medications








 Medications


  (Trade)  Dose


 Ordered  Sig/Xin


 Route


 PRN Reason  Start Time


 Stop Time Status Last Admin


Dose Admin


 


 Acetaminophen


  (Tylenol)  650 mg  PRN Q6HRS  PRN


 PO


 MILD PAIN / TEMP > 100.3'F  1/3/22 15:15


    1/16/22 11:48





 


 Multi-Ingredient


 Ointment


  (Analgesic Balm)  1 dwayne  PRN QID  PRN


 TP


 MUSCLE PAIN  1/3/22 15:15


     





 


 Al Hydroxide/Mg


 Hydroxide


  (Mylanta Plus Xs)  15 ml  PRN AFTMEALHC  PRN


 PO


 DYSPEPSIA  1/3/22 15:15


     





 


 Magnesium


 Hydroxide


  (Milk Of


 Magnesia)  2,400 mg  PRN QHS  PRN


 PO


 1st choice CONSTIPATION  1/3/22 15:15


     





 


 Diphenoxylate HCl/


 Atropine


  (Lomotil)  1 tab  PRN BID  PRN


 PO


 DIARRHEA  1/3/22 16:15


     





 


 Docusate Sodium


  (Colace)  100 mg  PRN DAILY  PRN


 PO


 2ND CHOICE CONSTIPATION  1/3/22 16:15


     





 


 Donepezil HCl


  (Aricept)  10 mg  QHS


 PO


   1/3/22 21:00


 1/7/22 11:37 DC 1/6/22 21:06





 


 Losartan Potassium


  (Cozaar)  50 mg  DAILY


 PO


   1/4/22 09:00


    1/17/22 08:37





 


 Memantine


  (Namenda)  5 mg  QHS


 PO


   1/3/22 21:00


 1/7/22 11:37 DC 1/6/22 21:06





 


 Simvastatin


  (Zocor)  40 mg  DAILY


 PO


   1/4/22 09:00


 1/8/22 12:13 DC 1/7/22 08:03





 


 Vitamin D


  (Vitamin D3)  5,000 unit  DAILY


 PO


   1/4/22 09:00


    1/17/22 08:36





 


 Glucosamine/


 Chondroitin


  (Glucosamine-Chondroitin


 500/400mg)  1 cap  DAILY


 PO


   1/4/22 09:00


    1/17/22 08:36





 


 Multivitamins/


 Calcium


  (Thera-M Plus)  1 tab  DAILY


 PO


   1/4/22 09:00


    1/17/22 08:37





 


 Acetaminophen/


 Codeine Phosphate


  (Tylenol #3)  1 tab  PRN BID  PRN


 PO


 PAIN  1/3/22 17:00


     





 


 Levofloxacin


  (Levaquin)  250 mg  DAILY06


 PO


   1/6/22 12:15


 1/8/22 12:13 DC 1/7/22 05:23





 


 Lactobacillus


 Rhamnosus


  (Culturelle)  1 cap  BID


 PO


   1/8/22 21:00


    1/18/22 20:00





 


 Simvastatin


  (Zocor)  40 mg  HS


 PO


   1/8/22 21:00


    1/18/22 20:00





 


 Doxycycline


 Hyclate


  (Vibra-Tab)  100 mg  BID


 PO


   1/8/22 21:00


 1/13/22 12:00 DC 1/13/22 08:11





 


 Olanzapine


  (ZyPREXA ZYDIS)  2.5 mg  PRN Q2HR  PRN


 PO


 PSYCHOSIS  1/10/22 12:00


    1/18/22 00:32





 


 Mirtazapine


  (Remeron)  7.5 mg  QHS


 PO


   1/10/22 21:00


    1/18/22 20:00





 


 Trazodone HCl


  (Desyrel)  50 mg  PRN QHS  PRN


 PO


 INSOMNIA  1/10/22 12:00


    1/18/22 20:01





 


 Sertraline HCl


  (Zoloft)  25 mg  QHS


 PO


   1/12/22 21:00


 1/14/22 23:00 DC 1/14/22 20:34





 


 Sertraline HCl


  (Zoloft)  50 mg  QHS


 PO


   1/15/22 21:00


 1/17/22 17:38 DC 1/16/22 20:19





 


 Quetiapine


 Fumarate


  (SEROquel)  12.5 mg  1700


 PO


   1/13/22 17:00


    1/18/22 16:24





 


 Sertraline HCl


  (Zoloft)  75 mg  QHS


 PO


   1/17/22 21:00


    1/18/22 20:00











I have reviewed the current psychotropics carefully including drug interactions.

 Risk benefit ratio favors no change other than as noted in my dictated progress

note.





Diagnosis:


Problems:  


(1) Impulse control disorder, unspecified


(2) Anxiety disorder, unspecified


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 

disturbance


(6) Major neurocognitive disorder











DAWNA BAILEY MD                 Jan 18, 2022 21:45

## 2022-01-18 NOTE — NUR
TONYA contacted pt /DPOA, Margarito, to discuss discharge for pt on Thursday.  TONYA informed 
Margarito that pt will need to have a follow up with her PCP, in which Margarito requested to set up 
the appointment himself.  TONYA noted that pt would also need to see either a psychiatrist or a 
neurologist to continue to follow pt Dementia progression and the medications.  Margarito will 
see if [facility] has one on staff.  TONYA noted that there is a neurologist here in 
town if [place] does not have one.  Margarito will contact his private duty company as well to 
make sure they can provide care the day pt arrives.  TONYA discussed the medications for pt and 
reiterated that he would pick pt up around 1300, in which Margarito agreed. TONYA encouraged Margarito to 
call TONYA if he had any further follow-up questions or concerns.

## 2022-01-18 NOTE — PDOC
Exam


Note:


Kuldip Note:


This note is a late entry for 01/17/2022 covers elements not covered in my 

initial note.





Subjective:  The patient was seen on telehealth rounds on 01/17/2022 with Bre VINSON, discussed and reviewed the chart.  The patient slept 8 hours previous night.

 Her urine output has been monitored.  She was incontinent of bowel and may have

urinated during that time.





Review of Systems:  No CV, , pulmonary, eye, ENT system symptoms on review.  

Reliability poor.





Mental Status Exam:  The patient is oriented to herself.  Insight and judgment, 

recent and remote memory, attention and concentration, fund of knowledge is poor

consistent with her diagnoses.





Laboratory Data:  Reviewed.





Impression:  Major neurocognitive disorder, Alzheimer, vascular with delusion, 

depression behavioral disturbance.  Anxiety disorder unspecified.  Impulse 

control disorder unspecified.





Plan:  Increase Zoloft from 50 mg a day to 75 mg a day.  Rest unchanged from 

initial note.





Assessment:


Vital Signs/I&O:





                                   Vital Signs








  Date Time  Temp Pulse Resp B/P (MAP) Pulse Ox O2 Delivery O2 Flow Rate FiO2


 


1/18/22 06:18 97.0 102 18 142/82 (102) 93 Room Air  














                                    I & O   


 


 1/17/22 1/17/22 1/18/22





 15:00 23:00 07:00


 


Intake Total 600 ml 360 ml 


 


Balance 600 ml 360 ml 











Current Medications:


Meds:





Current Medications








 Medications


  (Trade)  Dose


 Ordered  Sig/Xin


 Route


 PRN Reason  Start Time


 Stop Time Status Last Admin


Dose Admin


 


 Acetaminophen


  (Tylenol)  650 mg  PRN Q6HRS  PRN


 PO


 MILD PAIN / TEMP > 100.3'F  1/3/22 15:15


    1/16/22 11:48





 


 Multi-Ingredient


 Ointment


  (Analgesic Balm)  1 dwayne  PRN QID  PRN


 TP


 MUSCLE PAIN  1/3/22 15:15


     





 


 Al Hydroxide/Mg


 Hydroxide


  (Mylanta Plus Xs)  15 ml  PRN AFTMEALHC  PRN


 PO


 DYSPEPSIA  1/3/22 15:15


     





 


 Magnesium


 Hydroxide


  (Milk Of


 Magnesia)  2,400 mg  PRN QHS  PRN


 PO


 1st choice CONSTIPATION  1/3/22 15:15


     





 


 Diphenoxylate HCl/


 Atropine


  (Lomotil)  1 tab  PRN BID  PRN


 PO


 DIARRHEA  1/3/22 16:15


     





 


 Docusate Sodium


  (Colace)  100 mg  PRN DAILY  PRN


 PO


 2ND CHOICE CONSTIPATION  1/3/22 16:15


     





 


 Donepezil HCl


  (Aricept)  10 mg  QHS


 PO


   1/3/22 21:00


 1/7/22 11:37 DC 1/6/22 21:06





 


 Losartan Potassium


  (Cozaar)  50 mg  DAILY


 PO


   1/4/22 09:00


    1/17/22 08:37





 


 Memantine


  (Namenda)  5 mg  QHS


 PO


   1/3/22 21:00


 1/7/22 11:37 DC 1/6/22 21:06





 


 Simvastatin


  (Zocor)  40 mg  DAILY


 PO


   1/4/22 09:00


 1/8/22 12:13 DC 1/7/22 08:03





 


 Vitamin D


  (Vitamin D3)  5,000 unit  DAILY


 PO


   1/4/22 09:00


    1/17/22 08:36





 


 Glucosamine/


 Chondroitin


  (Glucosamine-Chondroitin


 500/400mg)  1 cap  DAILY


 PO


   1/4/22 09:00


    1/17/22 08:36





 


 Multivitamins/


 Calcium


  (Thera-M Plus)  1 tab  DAILY


 PO


   1/4/22 09:00


    1/17/22 08:37





 


 Acetaminophen/


 Codeine Phosphate


  (Tylenol #3)  1 tab  PRN BID  PRN


 PO


 PAIN  1/3/22 17:00


     





 


 Levofloxacin


  (Levaquin)  250 mg  DAILY06


 PO


   1/6/22 12:15


 1/8/22 12:13 DC 1/7/22 05:23





 


 Lactobacillus


 Rhamnosus


  (Culturelle)  1 cap  BID


 PO


   1/8/22 21:00


    1/17/22 20:36





 


 Simvastatin


  (Zocor)  40 mg  HS


 PO


   1/8/22 21:00


    1/17/22 20:37





 


 Doxycycline


 Hyclate


  (Vibra-Tab)  100 mg  BID


 PO


   1/8/22 21:00


 1/13/22 12:00 DC 1/13/22 08:11





 


 Olanzapine


  (ZyPREXA ZYDIS)  2.5 mg  PRN Q2HR  PRN


 PO


 PSYCHOSIS  1/10/22 12:00


    1/18/22 00:32





 


 Mirtazapine


  (Remeron)  7.5 mg  QHS


 PO


   1/10/22 21:00


    1/17/22 20:36





 


 Trazodone HCl


  (Desyrel)  50 mg  PRN QHS  PRN


 PO


 INSOMNIA  1/10/22 12:00


    1/17/22 20:37





 


 Sertraline HCl


  (Zoloft)  25 mg  QHS


 PO


   1/12/22 21:00


 1/14/22 23:00 DC 1/14/22 20:34





 


 Sertraline HCl


  (Zoloft)  50 mg  QHS


 PO


   1/15/22 21:00


 1/17/22 17:38 DC 1/16/22 20:19





 


 Quetiapine


 Fumarate


  (SEROquel)  12.5 mg  1700


 PO


   1/13/22 17:00


    1/17/22 17:24





 


 Sertraline HCl


  (Zoloft)  75 mg  QHS


 PO


   1/17/22 21:00


    1/17/22 20:37











Current Medications








 Medications


  (Trade)  Dose


 Ordered  Sig/Xin


 Route


 PRN Reason  Start Time


 Stop Time Status Last Admin


Dose Admin


 


 Sertraline HCl


  (Zoloft)  75 mg  QHS


 PO


   1/17/22 21:00


    1/17/22 20:37











I have reviewed the current psychotropics carefully including drug interactions.

 Risk benefit ratio favors no change other than as noted in my dictated progress

note.





Diagnosis:


Problems:  


(1) Impulse control disorder, unspecified


(2) Anxiety disorder, unspecified


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 

disturbance


(6) Major neurocognitive disorder











DAWNA BAILEY MD                 Jan 18, 2022 08:14

## 2022-01-19 VITALS — SYSTOLIC BLOOD PRESSURE: 124 MMHG | DIASTOLIC BLOOD PRESSURE: 85 MMHG

## 2022-01-19 VITALS — SYSTOLIC BLOOD PRESSURE: 130 MMHG | DIASTOLIC BLOOD PRESSURE: 78 MMHG

## 2022-01-19 LAB
APTT PPP: YELLOW S
BACTERIA #/AREA URNS HPF: 0 /HPF
BILIRUB UR QL STRIP: (no result)
FIBRINOGEN PPP-MCNC: (no result) MG/DL
GLUCOSE UR STRIP-MCNC: (no result) MG/DL
HYALINE CASTS #/AREA URNS LPF: (no result) /HPF
NITRITE UR QL STRIP: (no result)
RBC #/AREA URNS HPF: (no result) /HPF (ref 0–2)
SP GR UR STRIP: 1.02
SQUAMOUS #/AREA URNS LPF: (no result) /LPF
UROBILINOGEN UR-MCNC: 0.2 MG/DL
WBC #/AREA URNS HPF: (no result) /HPF (ref 0–4)

## 2022-01-19 RX ADMIN — SIMVASTATIN SCH MG: 40 TABLET, FILM COATED ORAL at 20:11

## 2022-01-19 RX ADMIN — MIRTAZAPINE SCH MG: 7.5 TABLET, FILM COATED ORAL at 20:10

## 2022-01-19 RX ADMIN — Medication SCH CAP: at 12:24

## 2022-01-19 RX ADMIN — VITAMIN D, TAB 1000IU (100/BT) SCH UNIT: 25 TAB at 12:25

## 2022-01-19 RX ADMIN — Medication SCH CAP: at 20:11

## 2022-01-19 RX ADMIN — QUETIAPINE FUMARATE SCH MG: 25 TABLET, FILM COATED ORAL at 16:36

## 2022-01-19 RX ADMIN — LOSARTAN POTASSIUM SCH MG: 50 TABLET, FILM COATED ORAL at 12:25

## 2022-01-19 RX ADMIN — MULTIPLE VITAMINS W/ MINERALS TAB SCH TAB: TAB at 12:24

## 2022-01-19 RX ADMIN — SERTRALINE HYDROCHLORIDE SCH MG: 50 TABLET ORAL at 20:11

## 2022-01-19 NOTE — NUR
[facility]

Social Work Discharge Planning Form



Patient Name MARIELA JOHNSON

Admit Date: 03 January 2022



DISCHARGE PLAN

Discharge Destination: Pt to discharge home with 

Care Assessment: N/A

Level II Assessment: N/A

Transportation: Pt  to pick pt up around 1300

Special Instructions/Notes: Please fax discharge orders, discharge medications and discharge 
summary to the fax numbers listed below.





DISCHARGE TO HOME: 

Address: [address] [place] [address]; [place] [address]

Responsible Party: Margarito Johnson



Pharmacy: [facility]

Contact Information:  [address] [place] [address], [place] [address]

           Phone:  [phone]          Fax:  [phone]



Psychiatrist/Mental Health Follow Up: Neurologist at [facility] -- specialty clinic 
with either Dr. Clark or Dr. Kirk

Contact Information:  [address] [place] [address]

                              Phone:  [phone]            Fax:  [phone]

Appointment:   to set up follow-up appt.



Primary Care Follow Up: Dr. [place]

Contact Information:  [address], [place] [id number]

           Phone:  [phone]          Fax:  [phone]

Appointment:   to set up follow up appt.



Home Health/Private Duty: [facility]

Contact Information:   [address], [place] [address]

            Phone:  [phone]         Fax:  [phone]

Appointment:  Will set up home services with pt .

## 2022-01-19 NOTE — PDOC
Exam


Note:


Kuldip Note:


This note is a late entry for 01/18/2022 covers elements not covered in my 

initial note.





Subjective:  The patient was reviewed on 01/18/2022 with Caitlyn VINSON, discussed 

and reviewed the chart.  The patient slept 0 hours previous night but we will 

make sure she receives the Remeron 7.5 mg h.s. and trazodone 50 mg h.s. tonight 

to help her sleep.  She has been disorganized having severe urinary retention.  

We will defer to Dr. Patiño.





Review of Systems:  Per nursing report, no CV, , pulmonary, eye, ENT system 

symptoms on review.  Reliability poor.





Mental Status Exam:  The patient is oriented to herself.  Insight and judgment, 

recent and remote memory, attention and concentration, fund of knowledge is poor

consistent with her diagnoses.





Laboratory Data:  Reviewed.





Impression:  Major neurocognitive disorder, Alzheimer, vascular with delusion, 

depression behavioral disturbance.  Anxiety disorder unspecified.  Impulse 

control disorder unspecified.





Plan:  No change from initial note.





Assessment:


Vital Signs/I&O:





                                   Vital Signs








  Date Time  Temp Pulse Resp B/P (MAP) Pulse Ox O2 Delivery O2 Flow Rate FiO2


 


1/19/22 05:29 98.6 84 18 130/78 (95) 96   


 


1/18/22 06:18      Room Air  














                                    I & O   


 


 1/18/22 1/18/22 1/19/22





 14:59 22:59 06:59


 


Intake Total 180 ml 240 ml 


 


Balance 180 ml 240 ml 











Current Medications:


Meds:





Current Medications








 Medications


  (Trade)  Dose


 Ordered  Sig/Xin


 Route


 PRN Reason  Start Time


 Stop Time Status Last Admin


Dose Admin


 


 Acetaminophen


  (Tylenol)  650 mg  PRN Q6HRS  PRN


 PO


 MILD PAIN / TEMP > 100.3'F  1/3/22 15:15


    1/16/22 11:48





 


 Multi-Ingredient


 Ointment


  (Analgesic Balm)  1 dwayne  PRN QID  PRN


 TP


 MUSCLE PAIN  1/3/22 15:15


     





 


 Al Hydroxide/Mg


 Hydroxide


  (Mylanta Plus Xs)  15 ml  PRN AFTMEALHC  PRN


 PO


 DYSPEPSIA  1/3/22 15:15


     





 


 Magnesium


 Hydroxide


  (Milk Of


 Magnesia)  2,400 mg  PRN QHS  PRN


 PO


 1st choice CONSTIPATION  1/3/22 15:15


     





 


 Diphenoxylate HCl/


 Atropine


  (Lomotil)  1 tab  PRN BID  PRN


 PO


 DIARRHEA  1/3/22 16:15


     





 


 Docusate Sodium


  (Colace)  100 mg  PRN DAILY  PRN


 PO


 2ND CHOICE CONSTIPATION  1/3/22 16:15


     





 


 Donepezil HCl


  (Aricept)  10 mg  QHS


 PO


   1/3/22 21:00


 1/7/22 11:37 DC 1/6/22 21:06





 


 Losartan Potassium


  (Cozaar)  50 mg  DAILY


 PO


   1/4/22 09:00


    1/17/22 08:37





 


 Memantine


  (Namenda)  5 mg  QHS


 PO


   1/3/22 21:00


 1/7/22 11:37 DC 1/6/22 21:06





 


 Simvastatin


  (Zocor)  40 mg  DAILY


 PO


   1/4/22 09:00


 1/8/22 12:13 DC 1/7/22 08:03





 


 Vitamin D


  (Vitamin D3)  5,000 unit  DAILY


 PO


   1/4/22 09:00


    1/17/22 08:36





 


 Glucosamine/


 Chondroitin


  (Glucosamine-Chondroitin


 500/400mg)  1 cap  DAILY


 PO


   1/4/22 09:00


    1/17/22 08:36





 


 Multivitamins/


 Calcium


  (Thera-M Plus)  1 tab  DAILY


 PO


   1/4/22 09:00


    1/17/22 08:37





 


 Acetaminophen/


 Codeine Phosphate


  (Tylenol #3)  1 tab  PRN BID  PRN


 PO


 PAIN  1/3/22 17:00


     





 


 Levofloxacin


  (Levaquin)  250 mg  DAILY06


 PO


   1/6/22 12:15


 1/8/22 12:13 DC 1/7/22 05:23





 


 Lactobacillus


 Rhamnosus


  (Culturelle)  1 cap  BID


 PO


   1/8/22 21:00


    1/18/22 20:00





 


 Simvastatin


  (Zocor)  40 mg  HS


 PO


   1/8/22 21:00


    1/18/22 20:00





 


 Doxycycline


 Hyclate


  (Vibra-Tab)  100 mg  BID


 PO


   1/8/22 21:00


 1/13/22 12:00 DC 1/13/22 08:11





 


 Olanzapine


  (ZyPREXA ZYDIS)  2.5 mg  PRN Q2HR  PRN


 PO


 PSYCHOSIS  1/10/22 12:00


    1/19/22 00:32





 


 Mirtazapine


  (Remeron)  7.5 mg  QHS


 PO


   1/10/22 21:00


    1/18/22 20:00





 


 Trazodone HCl


  (Desyrel)  50 mg  PRN QHS  PRN


 PO


 INSOMNIA  1/10/22 12:00


    1/18/22 20:01





 


 Sertraline HCl


  (Zoloft)  25 mg  QHS


 PO


   1/12/22 21:00


 1/14/22 23:00 DC 1/14/22 20:34





 


 Sertraline HCl


  (Zoloft)  50 mg  QHS


 PO


   1/15/22 21:00


 1/17/22 17:38 DC 1/16/22 20:19





 


 Quetiapine


 Fumarate


  (SEROquel)  12.5 mg  1700


 PO


   1/13/22 17:00


    1/18/22 16:24





 


 Sertraline HCl


  (Zoloft)  75 mg  QHS


 PO


   1/17/22 21:00


    1/18/22 20:00











I have reviewed the current psychotropics carefully including drug interactions.

 Risk benefit ratio favors no change other than as noted in my dictated progress

note.





Diagnosis:


Problems:  


(1) Impulse control disorder, unspecified


(2) Anxiety disorder, unspecified


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 

disturbance


(6) Major neurocognitive disorder











DAWNA BAILEY MD                 Jan 19, 2022 08:16

## 2022-01-19 NOTE — NUR
Patient slept in late in the morning and was  mostly drowsy for the early afternoon with 
poor appetite. She has been disorganized, cooperative, and pleasantly confused for the 
majority of this shift. She was more alert after supper but still ate very little at supper. 
Will continue to monitor and report to oncoming shift.

## 2022-01-19 NOTE — NUR
Patient has not urinated today. Bladderscanned patient, largest amount detected was ~170mL.  
Will report to oncoming shift.

## 2022-01-19 NOTE — NUR
Patients  called x2 this shift.  Nurse discussed patients bladder scanning for urine 
retention with , patient is scheduled to discharge tomorrow.  He stated he will be 
here at 1300 to  patient.

Patient has not been drinking very much fluids.  Patient took medications crushed in pudding 
and then drank 100 ml water with encouragement.  She is oriented only to herself. Patient 
was in bed this shift, no adverse behaviors noted.

## 2022-01-19 NOTE — NUR
Pt did not void overnight. Bladder scan performed revealing >510cc. Straight cath performed 
with staff x4. Pt was combative and resistive initially but calmed down. 750cc urine 
obtained. UA sent to lab.

## 2022-01-19 NOTE — PDOC
Exam


Note:


Kuldip Note:


Please also refer to the separate dictated note~for this date of service 

dictated separately.~Patient seen individually. Discussed the patient with 

Nursing staff reviewed the chart.~Reviewed interim history and current 

functioning. Reviewed vital signs,~Labs/ Radiology~and current medications noted

below. Continue current treatment with the changes noted in the dictated 

addendum note





Assessment:


Vital Signs/I&O:





                                   Vital Signs








  Date Time  Temp Pulse Resp B/P (MAP) Pulse Ox O2 Delivery O2 Flow Rate FiO2


 


1/19/22 15:48 98.0 68 18 124/85 (98) 92  0.0 


 


1/18/22 06:18      Room Air  














                                    I & O   


 


 1/18/22 1/18/22 1/19/22





 15:00 23:00 07:00


 


Intake Total 180 ml 240 ml 


 


Balance 180 ml 240 ml 








Labs:





                                Laboratory Tests








Test


 1/19/22


07:00


 


Urine Collection Type U cath  


 


Urine Color Yellow  


 


Urine Clarity Cloudy  


 


Urine pH 6.5  


 


Urine Specific Gravity 1.025  


 


Urine Protein


 Neg


(NEG-TRACE)


 


Urine Glucose (UA)


 Neg mg/dL


(NEG)


 


Urine Ketones (Stick)


 Neg mg/dL


(NEG)


 


Urine Blood Neg (NEG)  


 


Urine Nitrite Neg (NEG)  


 


Urine Bilirubin Neg (NEG)  


 


Urine Urobilinogen Dipstick


 0.2 mg/dL (0.2


mg/dL)


 


Urine Leukocyte Esterase Neg (NEG)  


 


Urine RBC


 Occ /HPF (0-2)





 


Urine WBC


 5-10 /HPF


(0-4)


 


Urine Squamous Epithelial


Cells Mod /LPF  





 


Urine Bacteria


 0 /HPF (0-FEW)





 


Urine Hyaline Casts Occ /HPF  


 


Urine Mucus Slight /LPF  











Current Medications:


Meds:





Laboratory Tests








Test


 1/19/22


07:00


 


Urine Collection Type U cath 


 


Urine Color Yellow 


 


Urine Clarity Cloudy 


 


Urine pH 6.5 


 


Urine Specific Gravity 1.025 


 


Urine Protein Neg 


 


Urine Glucose (UA) Neg mg/dL 


 


Urine Ketones (Stick) Neg mg/dL 


 


Urine Blood Neg 


 


Urine Nitrite Neg 


 


Urine Bilirubin Neg 


 


Urine Urobilinogen Dipstick 0.2 mg/dL 


 


Urine Leukocyte Esterase Neg 


 


Urine RBC Occ /HPF 


 


Urine WBC 5-10 /HPF 


 


Urine Squamous Epithelial


Cells Mod /LPF 





 


Urine Bacteria 0 /HPF 


 


Urine Hyaline Casts Occ /HPF 


 


Urine Mucus Slight /LPF 








Current Medications








 Medications


  (Trade)  Dose


 Ordered  Sig/Xin


 Route


 PRN Reason  Start Time


 Stop Time Status Last Admin


Dose Admin


 


 Acetaminophen


  (Tylenol)  650 mg  PRN Q6HRS  PRN


 PO


 MILD PAIN / TEMP > 100.3'F  1/3/22 15:15


    1/16/22 11:48





 


 Multi-Ingredient


 Ointment


  (Analgesic Balm)  1 dwayne  PRN QID  PRN


 TP


 MUSCLE PAIN  1/3/22 15:15


     





 


 Al Hydroxide/Mg


 Hydroxide


  (Mylanta Plus Xs)  15 ml  PRN AFTMEALHC  PRN


 PO


 DYSPEPSIA  1/3/22 15:15


     





 


 Magnesium


 Hydroxide


  (Milk Of


 Magnesia)  2,400 mg  PRN QHS  PRN


 PO


 1st choice CONSTIPATION  1/3/22 15:15


     





 


 Diphenoxylate HCl/


 Atropine


  (Lomotil)  1 tab  PRN BID  PRN


 PO


 DIARRHEA  1/3/22 16:15


     





 


 Docusate Sodium


  (Colace)  100 mg  PRN DAILY  PRN


 PO


 2ND CHOICE CONSTIPATION  1/3/22 16:15


     





 


 Donepezil HCl


  (Aricept)  10 mg  QHS


 PO


   1/3/22 21:00


 1/7/22 11:37 DC 1/6/22 21:06





 


 Losartan Potassium


  (Cozaar)  50 mg  DAILY


 PO


   1/4/22 09:00


    1/19/22 12:25





 


 Memantine


  (Namenda)  5 mg  QHS


 PO


   1/3/22 21:00


 1/7/22 11:37 DC 1/6/22 21:06





 


 Simvastatin


  (Zocor)  40 mg  DAILY


 PO


   1/4/22 09:00


 1/8/22 12:13 DC 1/7/22 08:03





 


 Vitamin D


  (Vitamin D3)  5,000 unit  DAILY


 PO


   1/4/22 09:00


    1/19/22 12:25





 


 Glucosamine/


 Chondroitin


  (Glucosamine-Chondroitin


 500/400mg)  1 cap  DAILY


 PO


   1/4/22 09:00


    1/19/22 12:24





 


 Multivitamins/


 Calcium


  (Thera-M Plus)  1 tab  DAILY


 PO


   1/4/22 09:00


    1/19/22 12:24





 


 Acetaminophen/


 Codeine Phosphate


  (Tylenol #3)  1 tab  PRN BID  PRN


 PO


 PAIN  1/3/22 17:00


     





 


 Levofloxacin


  (Levaquin)  250 mg  DAILY06


 PO


   1/6/22 12:15


 1/8/22 12:13 DC 1/7/22 05:23





 


 Lactobacillus


 Rhamnosus


  (Culturelle)  1 cap  BID


 PO


   1/8/22 21:00


    1/19/22 20:11





 


 Simvastatin


  (Zocor)  40 mg  HS


 PO


   1/8/22 21:00


    1/19/22 20:11





 


 Doxycycline


 Hyclate


  (Vibra-Tab)  100 mg  BID


 PO


   1/8/22 21:00


 1/13/22 12:00 DC 1/13/22 08:11





 


 Olanzapine


  (ZyPREXA ZYDIS)  2.5 mg  PRN Q2HR  PRN


 PO


 PSYCHOSIS  1/10/22 12:00


    1/19/22 00:32





 


 Mirtazapine


  (Remeron)  7.5 mg  QHS


 PO


   1/10/22 21:00


    1/19/22 20:10





 


 Trazodone HCl


  (Desyrel)  50 mg  PRN QHS  PRN


 PO


 INSOMNIA  1/10/22 12:00


    1/18/22 20:01





 


 Sertraline HCl


  (Zoloft)  25 mg  QHS


 PO


   1/12/22 21:00


 1/14/22 23:00 DC 1/14/22 20:34





 


 Sertraline HCl


  (Zoloft)  50 mg  QHS


 PO


   1/15/22 21:00


 1/17/22 17:38 DC 1/16/22 20:19





 


 Quetiapine


 Fumarate


  (SEROquel)  12.5 mg  1700


 PO


   1/13/22 17:00


    1/19/22 16:36





 


 Sertraline HCl


  (Zoloft)  75 mg  QHS


 PO


   1/17/22 21:00


    1/19/22 20:11











I have reviewed the current psychotropics carefully including drug interactions.

 Risk benefit ratio favors no change other than as noted in my dictated progress

note.





Diagnosis:


Problems:  


(1) Impulse control disorder, unspecified


(2) Anxiety disorder, unspecified


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 

disturbance


(6) Major neurocognitive disorder











DAWNA BAILEY MD                 Jan 19, 2022 21:29

## 2022-01-20 VITALS — DIASTOLIC BLOOD PRESSURE: 90 MMHG | SYSTOLIC BLOOD PRESSURE: 152 MMHG

## 2022-01-20 VITALS — SYSTOLIC BLOOD PRESSURE: 152 MMHG | DIASTOLIC BLOOD PRESSURE: 90 MMHG

## 2022-01-20 RX ADMIN — MULTIPLE VITAMINS W/ MINERALS TAB SCH TAB: TAB at 08:31

## 2022-01-20 RX ADMIN — Medication SCH CAP: at 08:31

## 2022-01-20 RX ADMIN — VITAMIN D, TAB 1000IU (100/BT) SCH UNIT: 25 TAB at 08:32

## 2022-01-20 RX ADMIN — Medication SCH CAP: at 08:37

## 2022-01-20 RX ADMIN — LOSARTAN POTASSIUM SCH MG: 50 TABLET, FILM COATED ORAL at 08:31

## 2022-01-20 NOTE — PDOC
Exam


Note:


Kuldip Note:


Please also refer to the separate dictated note~for this date of service 

dictated separately.~Patient seen individually. Discussed the patient with 

Nursing staff reviewed the chart.~Reviewed interim history and current 

functioning. Reviewed vital signs,~Labs/ Radiology~and current medications noted

below. Continue current treatment with the changes noted in the dictated 

addendum note





Assessment:


Vital Signs/I&O:





                                   Vital Signs








  Date Time  Temp Pulse Resp B/P (MAP) Pulse Ox O2 Delivery O2 Flow Rate FiO2


 


1/20/22 08:31  91  152/90    


 


1/20/22 05:43 98.4  18  96   


 


1/19/22 15:48       0.0 


 


1/18/22 06:18      Room Air  














                                    I & O   


 


 1/19/22 1/19/22 1/20/22





 15:00 23:00 07:00


 


Intake Total 480 ml 560 ml 


 


Output Total 750 ml  


 


Balance -270 ml 560 ml 











Current Medications:


Meds:





Current Medications








 Medications


  (Trade)  Dose


 Ordered  Sig/Xin


 Route


 PRN Reason  Start Time


 Stop Time Status Last Admin


Dose Admin


 


 Acetaminophen


  (Tylenol)  650 mg  PRN Q6HRS  PRN


 PO


 MILD PAIN / TEMP > 100.3'F  1/3/22 15:15


 1/20/22 14:04 DC 1/16/22 11:48





 


 Multi-Ingredient


 Ointment


  (Analgesic Balm)  1 dwayne  PRN QID  PRN


 TP


 MUSCLE PAIN  1/3/22 15:15


 1/20/22 14:04 DC  





 


 Al Hydroxide/Mg


 Hydroxide


  (Mylanta Plus Xs)  15 ml  PRN AFTMEALHC  PRN


 PO


 DYSPEPSIA  1/3/22 15:15


 1/20/22 14:04 DC  





 


 Magnesium


 Hydroxide


  (Milk Of


 Magnesia)  2,400 mg  PRN QHS  PRN


 PO


 1st choice CONSTIPATION  1/3/22 15:15


 1/20/22 14:04 DC  





 


 Diphenoxylate HCl/


 Atropine


  (Lomotil)  1 tab  PRN BID  PRN


 PO


 DIARRHEA  1/3/22 16:15


 1/20/22 14:04 DC  





 


 Docusate Sodium


  (Colace)  100 mg  PRN DAILY  PRN


 PO


 2ND CHOICE CONSTIPATION  1/3/22 16:15


 1/20/22 14:04 DC  





 


 Donepezil HCl


  (Aricept)  10 mg  QHS


 PO


   1/3/22 21:00


 1/7/22 11:37 DC 1/6/22 21:06





 


 Losartan Potassium


  (Cozaar)  50 mg  DAILY


 PO


   1/4/22 09:00


 1/20/22 14:04 DC 1/20/22 08:31





 


 Memantine


  (Namenda)  5 mg  QHS


 PO


   1/3/22 21:00


 1/7/22 11:37 DC 1/6/22 21:06





 


 Simvastatin


  (Zocor)  40 mg  DAILY


 PO


   1/4/22 09:00


 1/8/22 12:13 DC 1/7/22 08:03





 


 Vitamin D


  (Vitamin D3)  5,000 unit  DAILY


 PO


   1/4/22 09:00


 1/20/22 14:04 DC 1/20/22 08:32





 


 Glucosamine/


 Chondroitin


  (Glucosamine-Chondroitin


 500/400mg)  1 cap  DAILY


 PO


   1/4/22 09:00


 1/20/22 14:04 DC 1/20/22 08:31





 


 Multivitamins/


 Calcium


  (Thera-M Plus)  1 tab  DAILY


 PO


   1/4/22 09:00


 1/20/22 14:04 DC 1/20/22 08:31





 


 Acetaminophen/


 Codeine Phosphate


  (Tylenol #3)  1 tab  PRN BID  PRN


 PO


 PAIN  1/3/22 17:00


 1/20/22 14:04 DC  





 


 Levofloxacin


  (Levaquin)  250 mg  DAILY06


 PO


   1/6/22 12:15


 1/8/22 12:13 DC 1/7/22 05:23





 


 Lactobacillus


 Rhamnosus


  (Culturelle)  1 cap  BID


 PO


   1/8/22 21:00


 1/20/22 14:04 DC 1/20/22 08:37





 


 Simvastatin


  (Zocor)  40 mg  HS


 PO


   1/8/22 21:00


 1/20/22 14:04 DC 1/19/22 20:11





 


 Doxycycline


 Hyclate


  (Vibra-Tab)  100 mg  BID


 PO


   1/8/22 21:00


 1/13/22 12:00 DC 1/13/22 08:11





 


 Olanzapine


  (ZyPREXA ZYDIS)  2.5 mg  PRN Q2HR  PRN


 PO


 PSYCHOSIS  1/10/22 12:00


 1/20/22 14:04 DC 1/19/22 00:32





 


 Mirtazapine


  (Remeron)  7.5 mg  QHS


 PO


   1/10/22 21:00


 1/20/22 14:04 DC 1/19/22 20:10





 


 Trazodone HCl


  (Desyrel)  50 mg  PRN QHS  PRN


 PO


 INSOMNIA  1/10/22 12:00


 1/20/22 14:04 DC 1/18/22 20:01





 


 Sertraline HCl


  (Zoloft)  25 mg  QHS


 PO


   1/12/22 21:00


 1/14/22 23:00 DC 1/14/22 20:34





 


 Sertraline HCl


  (Zoloft)  50 mg  QHS


 PO


   1/15/22 21:00


 1/17/22 17:38 DC 1/16/22 20:19





 


 Quetiapine


 Fumarate


  (SEROquel)  12.5 mg  1700


 PO


   1/13/22 17:00


 1/20/22 14:04 DC 1/19/22 16:36





 


 Sertraline HCl


  (Zoloft)  75 mg  QHS


 PO


   1/17/22 21:00


 1/20/22 14:04 DC 1/19/22 20:11











I have reviewed the current psychotropics carefully including drug interactions.

 Risk benefit ratio favors no change other than as noted in my dictated progress

note.





Diagnosis:


Problems:  


(1) Impulse control disorder, unspecified


(2) Anxiety disorder, unspecified


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 

disturbance


(6) Major neurocognitive disorder











DAWNA BAILEY MD                 Jan 20, 2022 21:49

## 2022-01-20 NOTE — NUR
patients brief was dry this morning and she was unable to void into the toilet.  Bladder 
scan showed 280 ml in bladder.  Did not perform cath procedure.  Will pass on in report.

## 2022-01-20 NOTE — NUR
Nsg Note; Discharge:



Transition Record was faxed to follow-up provider with the following elements: Reason for 
admission, procedures, tests, principal diagnosis, pending studies, patient instructions, 
24/7 contact information for unit, phone number to obtain pending test results, plan for 
follow-up care, physician follow-up, advanced directive information, and medication list 
with dose, duration and instructions.



This information was included in the following documents: History and physical, lab results, 
study results, progress notes, social work planning form, DC instruction form, patient visit 
summary, and medication reconciliation form.



Date & time record faxed: on 1/20/22   to Dr Preston at 1319 @ [phone];   to Jayant Clark & 
Yelena at 1323 @ [phone];   to Halie Thorpe @ [phone]



Record discussed with her daughter on pickup. 



Pt was discharged at 1315 via amb accomp by staff who walked her out to her daughter's car.  
All personal items sent with pt



New prescriptions called to the [facility] pharmacy in [facility]: Lynette, Zyprexa zydis, 
Seroquel, Zoloft, and Trazadone.
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
## 2022-01-20 NOTE — NUR
Nsg Note; urine retention



pt has had some urinary retention.  She did not void this am.  At 1230, we put her on the 
toilet and she did not void.  Bladder scan showed 300 ml urine.  Straight cath performed at 
1240 as pt is to discharge home at 1300.  300 ml urine obtained from straight cath.

## 2022-01-21 NOTE — PDOC
Exam


Note:


Kuldip Note:


This note is a late entry for 01/19/2022 covers elements not covered in my 

initial note.





Subjective:  The patient was seen on telehealth rounds on 01/19/2022 with Jean VINSON, discussed and reviewed the chart.  The patient slept 8 hours previous night.

 She remains confused, calmer, restless, disorganized.  She has not been 

agitated or aggressive.





Review of Systems:  No CV, , pulmonary, eye, ENT system symptoms on review.  

Reliability poor.





Mental Status Exam:  The patient is oriented to herself.  Insight and judgment, 

recent and remote memory, attention and concentration, fund of knowledge is poor

consistent with her diagnoses.





Laboratory Data:  Reviewed.





Impression:  Major neurocognitive disorder, Alzheimer, vascular with delusion, 

depression behavioral disturbance.  Anxiety disorder unspecified.  Impulse co

ntrol disorder unspecified.





Plan:  No change from initial note.  Overall she remains confused, not very 

verbally interactive, not aggressive.  We will discharge home with outpatient 

psychiatric medical follow up on 01/20/2022 as the  is unwilling to 

change the date despite her Covid-19 quarantine period.





Assessment:


Vital Signs/I&O:





                                   Vital Signs








  Date Time  Temp Pulse Resp B/P (MAP) Pulse Ox O2 Delivery O2 Flow Rate FiO2


 


1/20/22 08:31  91  152/90    


 


1/20/22 05:43 98.4  18  96   


 


1/19/22 15:48       0.0 


 


1/18/22 06:18      Room Air  














                                    I & O   


 


 1/20/22 1/20/22 1/21/22





 15:00 23:00 07:00


 


Intake Total 360 ml  


 


Balance 360 ml  











Current Medications:


Meds:





Current Medications








 Medications


  (Trade)  Dose


 Ordered  Sig/Xin


 Route


 PRN Reason  Start Time


 Stop Time Status Last Admin


Dose Admin


 


 Acetaminophen


  (Tylenol)  650 mg  PRN Q6HRS  PRN


 PO


 MILD PAIN / TEMP > 100.3'F  1/3/22 15:15


 1/20/22 14:04 DC 1/16/22 11:48





 


 Multi-Ingredient


 Ointment


  (Analgesic Balm)  1 dwayne  PRN QID  PRN


 TP


 MUSCLE PAIN  1/3/22 15:15


 1/20/22 14:04 DC  





 


 Al Hydroxide/Mg


 Hydroxide


  (Mylanta Plus Xs)  15 ml  PRN AFTMEALHC  PRN


 PO


 DYSPEPSIA  1/3/22 15:15


 1/20/22 14:04 DC  





 


 Magnesium


 Hydroxide


  (Milk Of


 Magnesia)  2,400 mg  PRN QHS  PRN


 PO


 1st choice CONSTIPATION  1/3/22 15:15


 1/20/22 14:04 DC  





 


 Diphenoxylate HCl/


 Atropine


  (Lomotil)  1 tab  PRN BID  PRN


 PO


 DIARRHEA  1/3/22 16:15


 1/20/22 14:04 DC  





 


 Docusate Sodium


  (Colace)  100 mg  PRN DAILY  PRN


 PO


 2ND CHOICE CONSTIPATION  1/3/22 16:15


 1/20/22 14:04 DC  





 


 Donepezil HCl


  (Aricept)  10 mg  QHS


 PO


   1/3/22 21:00


 1/7/22 11:37 DC 1/6/22 21:06





 


 Losartan Potassium


  (Cozaar)  50 mg  DAILY


 PO


   1/4/22 09:00


 1/20/22 14:04 DC 1/20/22 08:31





 


 Memantine


  (Namenda)  5 mg  QHS


 PO


   1/3/22 21:00


 1/7/22 11:37 DC 1/6/22 21:06





 


 Simvastatin


  (Zocor)  40 mg  DAILY


 PO


   1/4/22 09:00


 1/8/22 12:13 DC 1/7/22 08:03





 


 Vitamin D


  (Vitamin D3)  5,000 unit  DAILY


 PO


   1/4/22 09:00


 1/20/22 14:04 DC 1/20/22 08:32





 


 Glucosamine/


 Chondroitin


  (Glucosamine-Chondroitin


 500/400mg)  1 cap  DAILY


 PO


   1/4/22 09:00


 1/20/22 14:04 DC 1/20/22 08:31





 


 Multivitamins/


 Calcium


  (Thera-M Plus)  1 tab  DAILY


 PO


   1/4/22 09:00


 1/20/22 14:04 DC 1/20/22 08:31





 


 Acetaminophen/


 Codeine Phosphate


  (Tylenol #3)  1 tab  PRN BID  PRN


 PO


 PAIN  1/3/22 17:00


 1/20/22 14:04 DC  





 


 Levofloxacin


  (Levaquin)  250 mg  DAILY06


 PO


   1/6/22 12:15


 1/8/22 12:13 DC 1/7/22 05:23





 


 Lactobacillus


 Rhamnosus


  (Culturelle)  1 cap  BID


 PO


   1/8/22 21:00


 1/20/22 14:04 DC 1/20/22 08:37





 


 Simvastatin


  (Zocor)  40 mg  HS


 PO


   1/8/22 21:00


 1/20/22 14:04 DC 1/19/22 20:11





 


 Doxycycline


 Hyclate


  (Vibra-Tab)  100 mg  BID


 PO


   1/8/22 21:00


 1/13/22 12:00 DC 1/13/22 08:11





 


 Olanzapine


  (ZyPREXA ZYDIS)  2.5 mg  PRN Q2HR  PRN


 PO


 PSYCHOSIS  1/10/22 12:00


 1/20/22 14:04 DC 1/19/22 00:32





 


 Mirtazapine


  (Remeron)  7.5 mg  QHS


 PO


   1/10/22 21:00


 1/20/22 14:04 DC 1/19/22 20:10





 


 Trazodone HCl


  (Desyrel)  50 mg  PRN QHS  PRN


 PO


 INSOMNIA  1/10/22 12:00


 1/20/22 14:04 DC 1/18/22 20:01





 


 Sertraline HCl


  (Zoloft)  25 mg  QHS


 PO


   1/12/22 21:00


 1/14/22 23:00 DC 1/14/22 20:34





 


 Sertraline HCl


  (Zoloft)  50 mg  QHS


 PO


   1/15/22 21:00


 1/17/22 17:38 DC 1/16/22 20:19





 


 Quetiapine


 Fumarate


  (SEROquel)  12.5 mg  1700


 PO


   1/13/22 17:00


 1/20/22 14:04 DC 1/19/22 16:36





 


 Sertraline HCl


  (Zoloft)  75 mg  QHS


 PO


   1/17/22 21:00


 1/20/22 14:04 DC 1/19/22 20:11











I have reviewed the current psychotropics carefully including drug interactions.

 Risk benefit ratio favors no change other than as noted in my dictated progress

note.





Diagnosis:


Problems:  


(1) Impulse control disorder, unspecified


(2) Anxiety disorder, unspecified


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 

disturbance


(6) Major neurocognitive disorder











DAWNA BAILEY MD                 Jan 21, 2022 07:40
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
